# Patient Record
Sex: FEMALE | Race: WHITE | NOT HISPANIC OR LATINO | Employment: OTHER | URBAN - NONMETROPOLITAN AREA
[De-identification: names, ages, dates, MRNs, and addresses within clinical notes are randomized per-mention and may not be internally consistent; named-entity substitution may affect disease eponyms.]

---

## 2017-03-03 ENCOUNTER — NON-PROVIDER VISIT (OUTPATIENT)
Dept: URGENT CARE | Facility: PHYSICIAN GROUP | Age: 22
End: 2017-03-03

## 2017-03-03 DIAGNOSIS — Z11.1 ENCOUNTER FOR PPD TEST: ICD-10-CM

## 2017-03-03 PROCEDURE — 86580 TB INTRADERMAL TEST: CPT | Performed by: NURSE PRACTITIONER

## 2017-03-06 ENCOUNTER — NON-PROVIDER VISIT (OUTPATIENT)
Dept: URGENT CARE | Facility: PHYSICIAN GROUP | Age: 22
End: 2017-03-06

## 2017-03-06 DIAGNOSIS — Z11.1 PPD SCREENING TEST: ICD-10-CM

## 2017-03-06 LAB — TB WHEAL 3D P 5 TU DIAM: NEGATIVE MM

## 2017-12-07 ENCOUNTER — NON-PROVIDER VISIT (OUTPATIENT)
Dept: URGENT CARE | Facility: PHYSICIAN GROUP | Age: 22
End: 2017-12-07

## 2017-12-07 DIAGNOSIS — Z02.1 PRE-EMPLOYMENT DRUG SCREENING: ICD-10-CM

## 2017-12-07 LAB
AMP AMPHETAMINE: NORMAL
COC COCAINE: NORMAL
INT CON NEG: NORMAL
INT CON POS: NORMAL
MET METHAMPHETAMINES: NORMAL
OPI OPIATES: NORMAL
PCP PHENCYCLIDINE: NORMAL
POC DRUG COMMENT 753798-OCCUPATIONAL HEALTH: NORMAL
THC: NORMAL

## 2017-12-07 PROCEDURE — 80305 DRUG TEST PRSMV DIR OPT OBS: CPT | Performed by: PHYSICIAN ASSISTANT

## 2018-05-09 ENCOUNTER — NON-PROVIDER VISIT (OUTPATIENT)
Dept: URGENT CARE | Facility: PHYSICIAN GROUP | Age: 23
End: 2018-05-09

## 2018-05-09 DIAGNOSIS — Z11.1 SPECIAL SCREENING EXAMINATION FOR RESPIRATORY TUBERCULOSIS: ICD-10-CM

## 2018-05-09 PROCEDURE — 86580 TB INTRADERMAL TEST: CPT | Performed by: NURSE PRACTITIONER

## 2018-05-11 ENCOUNTER — NON-PROVIDER VISIT (OUTPATIENT)
Dept: URGENT CARE | Facility: PHYSICIAN GROUP | Age: 23
End: 2018-05-11

## 2018-05-11 DIAGNOSIS — Z11.1 SCREENING EXAMINATION FOR PULMONARY TUBERCULOSIS: ICD-10-CM

## 2018-05-11 LAB — TB WHEAL 3D P 5 TU DIAM: NORMAL MM

## 2018-05-16 ENCOUNTER — NON-PROVIDER VISIT (OUTPATIENT)
Dept: URGENT CARE | Facility: PHYSICIAN GROUP | Age: 23
End: 2018-05-16

## 2018-05-16 DIAGNOSIS — Z11.1 SPECIAL SCREENING EXAMINATION FOR RESPIRATORY TUBERCULOSIS: ICD-10-CM

## 2018-05-16 PROCEDURE — 86580 TB INTRADERMAL TEST: CPT | Performed by: NURSE PRACTITIONER

## 2018-05-19 ENCOUNTER — NON-PROVIDER VISIT (OUTPATIENT)
Dept: URGENT CARE | Facility: PHYSICIAN GROUP | Age: 23
End: 2018-05-19

## 2018-05-19 LAB — TB WHEAL 3D P 5 TU DIAM: NORMAL MM

## 2018-09-17 ENCOUNTER — NON-PROVIDER VISIT (OUTPATIENT)
Dept: URGENT CARE | Facility: PHYSICIAN GROUP | Age: 23
End: 2018-09-17

## 2018-09-17 DIAGNOSIS — Z02.1 PRE-EMPLOYMENT DRUG SCREENING: ICD-10-CM

## 2018-09-17 LAB
AMP AMPHETAMINE: NORMAL
COC COCAINE: NORMAL
INT CON NEG: NORMAL
INT CON POS: NORMAL
MET METHAMPHETAMINES: NORMAL
OPI OPIATES: NORMAL
PCP PHENCYCLIDINE: NORMAL
POC DRUG COMMENT 753798-OCCUPATIONAL HEALTH: NEGATIVE
THC: NORMAL

## 2018-09-17 PROCEDURE — 80305 DRUG TEST PRSMV DIR OPT OBS: CPT | Performed by: PHYSICIAN ASSISTANT

## 2023-03-16 ENCOUNTER — OFFICE VISIT (OUTPATIENT)
Dept: URGENT CARE | Facility: PHYSICIAN GROUP | Age: 28
End: 2023-03-16
Payer: COMMERCIAL

## 2023-03-16 VITALS
SYSTOLIC BLOOD PRESSURE: 96 MMHG | RESPIRATION RATE: 14 BRPM | BODY MASS INDEX: 20.4 KG/M2 | HEIGHT: 67 IN | HEART RATE: 88 BPM | WEIGHT: 130 LBS | OXYGEN SATURATION: 97 % | TEMPERATURE: 98 F | DIASTOLIC BLOOD PRESSURE: 58 MMHG

## 2023-03-16 DIAGNOSIS — R52 BODY ACHES: ICD-10-CM

## 2023-03-16 LAB
FLUAV RNA SPEC QL NAA+PROBE: NEGATIVE
FLUBV RNA SPEC QL NAA+PROBE: NEGATIVE
RSV RNA SPEC QL NAA+PROBE: NEGATIVE
S PYO DNA SPEC NAA+PROBE: NOT DETECTED
SARS-COV-2 RNA RESP QL NAA+PROBE: POSITIVE

## 2023-03-16 PROCEDURE — 87651 STREP A DNA AMP PROBE: CPT | Performed by: NURSE PRACTITIONER

## 2023-03-16 PROCEDURE — 99214 OFFICE O/P EST MOD 30 MIN: CPT | Performed by: NURSE PRACTITIONER

## 2023-03-16 PROCEDURE — 0241U POCT CEPHEID COV-2, FLU A/B, RSV - PCR: CPT | Performed by: NURSE PRACTITIONER

## 2023-03-16 ASSESSMENT — ENCOUNTER SYMPTOMS
NAUSEA: 0
SORE THROAT: 0
COUGH: 1
EYE DISCHARGE: 0
CHILLS: 1
ABDOMINAL PAIN: 0
VOMITING: 0
SHORTNESS OF BREATH: 0
DIZZINESS: 1
SPUTUM PRODUCTION: 1
DIARRHEA: 0
EYE PAIN: 0
HEADACHES: 1
WHEEZING: 0
SINUS PAIN: 1
EYE REDNESS: 0

## 2023-03-16 NOTE — PROGRESS NOTES
Patient has consented to treatment and for use of patient information for treatment and billing purposes.    Chief Complaint:    Chief Complaint   Patient presents with    Body Aches     X2 days body aches, mostly on shoulders and neck, pressure in B ears, dizzy congested, slight cough         History of Present Illness: 27 y.o. female  presents 2-day history of body aches in her upper back and neck, bilateral ear pressure, cough with green and clear phlegm, sinus pressure, nasal congestion with green and clear mucus, chills, wheezing, and fatigue.  She is unsure if she has had any fevers    Take over-the-counter Tylenol which helped minimally with her symptoms mostly with body aches  Home COVID test x 3 were all negative at home.   Denies any known exposure to COVID, RSV, influenza, or strep.    She did recently start going back to the gym.    She does state that she is currently 10 weeks pregnant.  She does follow with her OB/GYN appointment is scheduled for .  She denies any abnormal cramping, she denies any vaginal discharge or bleeding.        Review of Systems   Constitutional:  Positive for chills. Negative for malaise/fatigue.   HENT:  Positive for congestion, ear pain and sinus pain. Negative for ear discharge and sore throat.    Eyes:  Negative for pain, discharge and redness.   Respiratory:  Positive for cough and sputum production. Negative for shortness of breath and wheezing.    Gastrointestinal:  Negative for abdominal pain, diarrhea, nausea and vomiting.   Skin:  Negative for rash.   Neurological:  Positive for dizziness and headaches.       Medications, Allergies, and current problem list reviewed today in Epic.    Physical Exam:  Vitals:    23 1129   BP: 96/58   Pulse: 88   Resp: 14   Temp: 36.7 °C (98 °F)   SpO2: 97%        Physical Exam  Vitals reviewed.   Constitutional:       General: She is not in acute distress.     Appearance: Normal appearance. She is ill-appearing. She is  not toxic-appearing.   HENT:      Right Ear: Tympanic membrane, ear canal and external ear normal.      Left Ear: Tympanic membrane, ear canal and external ear normal.      Nose: Mucosal edema and rhinorrhea present. Rhinorrhea is clear.      Right Sinus: No maxillary sinus tenderness or frontal sinus tenderness.      Left Sinus: No maxillary sinus tenderness or frontal sinus tenderness.      Mouth/Throat:      Lips: Pink.      Mouth: Mucous membranes are moist.      Pharynx: Posterior oropharyngeal erythema present. No oropharyngeal exudate or uvula swelling.      Tonsils: No tonsillar exudate.   Cardiovascular:      Rate and Rhythm: Normal rate and regular rhythm.      Heart sounds: No murmur heard.  Pulmonary:      Effort: Pulmonary effort is normal. No respiratory distress.      Breath sounds: Normal breath sounds. No wheezing, rhonchi or rales.   Musculoskeletal:      Cervical back: Normal range of motion.   Lymphadenopathy:      Cervical: No cervical adenopathy.   Skin:     General: Skin is warm and dry.   Neurological:      Mental Status: She is alert.        Diagnostics:  Results for orders placed or performed in visit on 03/16/23   POCT GROUP A STREP, PCR   Result Value Ref Range    POC Group A Strep, PCR Not Detected Not Detected, Invalid   POCT CoV-2, Flu A/B, RSV by PCR   Result Value Ref Range    SARS-CoV-2 by PCR Positive (A) Negative, Invalid    Influenza virus A RNA Negative Negative, Invalid    Influenza virus B, PCR Negative Negative, Invalid    RSV, PCR Negative Negative, Invalid       Diagnostics interpreted by myself.    Medical Decision Making:  I personally reviewed prior external notes and test results pertinent to today's visit.   Shared decision-making was utilized with patient did develop treatment plan and clinic course. Pleasant, 27 y.o. female  presents 2-day history of body aches in her upper back and neck, bilateral ear pressure, cough with green and clear phlegm, sinus pressure,  nasal congestion with green and clear mucus, chills, wheezing, and fatigue.  She is unsure if she has had any fevers    Take over-the-counter Tylenol which helped minimally with her symptoms mostly with body aches  Home COVID test x 3 were all negative at home.   Denies any known exposure to COVID, RSV, influenza, or strep.    She did recently start going back to the gym.    She does state that she is currently 10 weeks pregnant.  She does follow with her OB/GYN appointment is scheduled for .  She denies any abnormal cramping, she denies any vaginal discharge or bleeding.          1300-attempted to call patient to discuss positive COVID test results.  Left voicemail for her to call back.    1345-called patient and discussed positive COVID test.  Discussed prescribing Paxlovid versus nontreatment options.  Patient does want to discuss options with her  and possibly OB/GYN.  Did discuss data available on up-to-date as listed below.  At this time she has declined Paxlovid.  She reports that she did have COVID during her last pregnancy at 7 months and did well.    Up-to-date  Pregnancy Considerations   Adverse events were observed following exposure to nirmatrelvir in some embryo-fetal developmental toxicity studies (FDA ).  Nirmatrelvir and ritonavir are currently available under FDA emergency use authorization (EUA) for the treatment of COVID-19. Pregnant patients were not eligible for inclusion in an initial phase 2/3 study (Mikel ).  Refer to the Ritonavir monograph for information related to ritonavir and pregnancy.  The risk of severe illness from COVID-19 infection is increased in symptomatic pregnant patients compared to nonpregnant patients. Pregnant and recently pregnant patients with moderate or severe infection are at increased risk of complications such as hypertensive disorders of pregnancy, postpartum hemorrhage, or other infections compared to pregnant patients without  COVID-19. Symptomatic pregnant patients may require ICU admission, mechanical ventilation, or ventilatory support (ECMO) compared to symptomatic nonpregnant patients. Other adverse pregnancy outcomes include  birth and stillbirth. The risk of coagulopathy,  delivery, and maternal death may be increased; neonates have an increased risk for NICU admission. Maternal age and comorbidities such as diabetes, hypertension, lung disease, and obesity may also increase the risk of severe illness in pregnant and recently pregnant patients (ACOG 2022; NIH 2022).  Outcome data following maternal use of nirmatrelvir and ritonavir during pregnancy are limited (Muñoz 2022). In general, the treatment of COVID-19 infection during pregnancy is the same as in nonpregnant patients. However, because data for most therapeutic agents in pregnant patients are limited, treatment options should be evaluated as part of a shared decision-making process (NIH 2022). Pregnancy is a risk factor for severe COVID-19; the use of nirmatrelvir and ritonavir may be offered to pregnant and recently pregnant patients (NIH 2022). Use may be considered in nonhospitalized, COVID-19-positive, pregnant patients who have mild to moderate symptoms, especially patients with one or more additional risk factors (eg, BMI >25, cardiovascular disease, chronic kidney disease, diabetes mellitus) (ACOG 2022). Evaluate potential drug interactions prior to prescribing (ACOG 2022; NIH 2022). Information related to the treatment of COVID-19 during pregnancy continues to emerge; refer to current guidelines for the treatment of pregnant patients.  Data collection to monitor maternal and infant outcomes following exposure to COVID-19 during pregnancy is ongoing. Health care providers are encouraged to enroll patients exposed to COVID-19 during pregnancy in the [employer] pregnancy registry (1-367.158.4014;  https://[facility].org/join-study/).      Did advise patient on conservative measures for management of symptoms.  Patient is agreeable to pursue adequate rest, adequate hydration, saltwater gargle and Neti pot for any symptoms of upper respiratory congestion.  Over-the-counter analgesia and antipyretics on a p.r.n. basis as needed for pain and fever.  Did discuss over-the-counter cough medications.      Patient will monitor symptoms closely for worsening and is advised to seek further evaluation the emergency room if alarm signs or symptoms arise.  Patient states understanding and verbalizes agreement with this plan of care.    Plan:  Verbal and/or printed education was provided regarding the assessment and diagnosis.  All of the patient's questions were answered to their satisfaction at the time of discharge.    1. Body aches  - POCT GROUP A STREP, PCR  - POCT CoV-2, Flu A/B, RSV by PCR      My total time spent caring for the patient on the day of the encounter was 33 minutes.   This does not include time spent on separately billable procedures/tests.    Disposition:  Patient was discharged in stable condition.    Voice Recognition Disclaimer: Portions of this document were created using voice recognition software. The software does have a chance of producing errors of grammar and possibly content. I have made every reasonable attempt to correct obvious errors, but there may be errors of grammar and possibly content that I did not discover before finalizing the documentation.

## 2023-05-03 LAB
HBV SURFACE AG SERPL QL IA: NEGATIVE
HIV 1+2 AB+HIV1 P24 AG SERPL QL IA: NON REACTIVE
RUBV IGG SERPL IA-ACNC: NORMAL

## 2023-06-20 ENCOUNTER — HOSPITAL ENCOUNTER (INPATIENT)
Facility: MEDICAL CENTER | Age: 28
LOS: 7 days | DRG: 833 | End: 2023-06-29
Attending: OBSTETRICS & GYNECOLOGY | Admitting: OBSTETRICS & GYNECOLOGY
Payer: COMMERCIAL

## 2023-06-20 LAB
ABO + RH BLD: NORMAL
ABO GROUP BLD: NORMAL
BLD GP AB SCN SERPL QL: NORMAL
RH BLD: NORMAL

## 2023-06-20 PROCEDURE — 86901 BLOOD TYPING SEROLOGIC RH(D): CPT

## 2023-06-20 PROCEDURE — 36415 COLL VENOUS BLD VENIPUNCTURE: CPT

## 2023-06-20 PROCEDURE — 86900 BLOOD TYPING SEROLOGIC ABO: CPT

## 2023-06-20 PROCEDURE — G0378 HOSPITAL OBSERVATION PER HR: HCPCS

## 2023-06-20 PROCEDURE — 302449 STATCHG TRIAGE ONLY (STATISTIC)

## 2023-06-20 PROCEDURE — 96372 THER/PROPH/DIAG INJ SC/IM: CPT

## 2023-06-20 PROCEDURE — 700111 HCHG RX REV CODE 636 W/ 250 OVERRIDE (IP): Performed by: OBSTETRICS & GYNECOLOGY

## 2023-06-20 PROCEDURE — 86850 RBC ANTIBODY SCREEN: CPT

## 2023-06-20 RX ORDER — BETAMETHASONE SODIUM PHOSPHATE AND BETAMETHASONE ACETATE 3; 3 MG/ML; MG/ML
12 INJECTION, SUSPENSION INTRA-ARTICULAR; INTRALESIONAL; INTRAMUSCULAR; SOFT TISSUE EVERY 24 HOURS
Status: COMPLETED | OUTPATIENT
Start: 2023-06-20 | End: 2023-06-21

## 2023-06-20 RX ADMIN — BETAMETHASONE SODIUM PHOSPHATE AND BETAMETHASONE ACETATE 12 MG: 3; 3 INJECTION, SUSPENSION INTRA-ARTICULAR; INTRALESIONAL; INTRAMUSCULAR at 17:44

## 2023-06-20 ASSESSMENT — LIFESTYLE VARIABLES
ALCOHOL_USE: NO
EVER_SMOKED: NEVER

## 2023-06-20 ASSESSMENT — PAIN SCALES - GENERAL: PAINLEVEL: 0 - NO PAIN

## 2023-06-20 ASSESSMENT — PATIENT HEALTH QUESTIONNAIRE - PHQ9
SUM OF ALL RESPONSES TO PHQ9 QUESTIONS 1 AND 2: 0
1. LITTLE INTEREST OR PLEASURE IN DOING THINGS: NOT AT ALL
2. FEELING DOWN, DEPRESSED, IRRITABLE, OR HOPELESS: NOT AT ALL

## 2023-06-21 PROCEDURE — A9270 NON-COVERED ITEM OR SERVICE: HCPCS | Performed by: OBSTETRICS & GYNECOLOGY

## 2023-06-21 PROCEDURE — 96372 THER/PROPH/DIAG INJ SC/IM: CPT

## 2023-06-21 PROCEDURE — 302790 HCHG STAT ANTEPARTUM CARE, DAILY

## 2023-06-21 PROCEDURE — 700102 HCHG RX REV CODE 250 W/ 637 OVERRIDE(OP): Performed by: OBSTETRICS & GYNECOLOGY

## 2023-06-21 PROCEDURE — 700111 HCHG RX REV CODE 636 W/ 250 OVERRIDE (IP): Performed by: OBSTETRICS & GYNECOLOGY

## 2023-06-21 PROCEDURE — G0378 HOSPITAL OBSERVATION PER HR: HCPCS

## 2023-06-21 RX ORDER — VITAMIN A ACETATE, BETA CAROTENE, ASCORBIC ACID, CHOLECALCIFEROL, .ALPHA.-TOCOPHEROL ACETATE, DL-, THIAMINE MONONITRATE, RIBOFLAVIN, NIACINAMIDE, PYRIDOXINE HYDROCHLORIDE, FOLIC ACID, CYANOCOBALAMIN, CALCIUM CARBONATE, FERROUS FUMARATE, ZINC OXIDE, CUPRIC OXIDE 3080; 12; 120; 400; 1; 1.84; 3; 20; 22; 920; 25; 200; 27; 10; 2 [IU]/1; UG/1; MG/1; [IU]/1; MG/1; MG/1; MG/1; MG/1; MG/1; [IU]/1; MG/1; MG/1; MG/1; MG/1; MG/1
1 TABLET, FILM COATED ORAL
Status: DISCONTINUED | OUTPATIENT
Start: 2023-06-21 | End: 2023-06-29 | Stop reason: HOSPADM

## 2023-06-21 RX ADMIN — BETAMETHASONE SODIUM PHOSPHATE AND BETAMETHASONE ACETATE 12 MG: 3; 3 INJECTION, SUSPENSION INTRA-ARTICULAR; INTRALESIONAL; INTRAMUSCULAR at 17:30

## 2023-06-21 RX ADMIN — PRENATAL WITH FERROUS FUM AND FOLIC ACID 1 TABLET: 3080; 920; 120; 400; 22; 1.84; 3; 20; 10; 1; 12; 200; 27; 25; 2 TABLET ORAL at 10:03

## 2023-06-21 NOTE — PROGRESS NOTES
OB Progress Note  23w5d  Date of Admission: 2023      Subjective:   Pt reports feeling well, +FM x2, denies LOF/VB/ctx.      Objective:   24hr VS:  Temp:  [9 °C (48.2 °F)-36.4 °C (97.6 °F)] 36.4 °C (97.6 °F)  Pulse:  [] 83  Resp:  [16-18] 17  BP: (102-110)/(57-58) 102/57  SpO2:  [95 %] 95 %    Gen: AAO  Abdomen: gravid, soft, NT  Ext: NT, no edema    NST:  Indication: fetal growth restriction abnormal, UA dopplers  A: 140/moderate variability/+ accelerations/no decelerations  B: 150/moderate variability/+ accelerations/no decelerations  [place]: no ctx      Meds:     Current Facility-Administered Medications:     prenatal plus vitamin (STUARTNATAL 1+1) 27-1 MG tablet 1 Tablet, 1 Tablet, Oral, Daily-0800, Gena Staley M.D.    betamethasone acetate-betamethasone sodium phosphate (CELESTONE) injection 12 mg, 12 mg, Intramuscular, Q24HR, Becca Reyes M.D., 12 mg at 23 1744    Labs:    Lab:   Recent Results (from the past 72 hour(s))   COD - Adult (Type and Screen)    Collection Time: 23  6:50 PM   Result Value Ref Range    ABO Grouping Only A     Rh Grouping Only POS     Antibody Screen-Cod NEG    ABO Rh Confirm    Collection Time: 23  7:39 PM   Result Value Ref Range    ABO Rh Confirm A POS        A/P:  27 y.o.  @ 23w5d w/ mo-di TIUP, severe growth restriction of twin B with absent end diastolic flow    - Growth US : A 603g (38%)/B 474g (3%) suspected unequal placental sharing  over TTTS, appreciate MFM involvement and guidance regarding monitoring/management.  - continuous  monitoring overnight reassuring, now decreased to 1hr per shift, discussed with Dr. Howard.   - BMZ #1 given last night, #2 to be given this evening.  - if signs of needing to consider delivery would start mag sulfate for fetal neuroprotection.    - pt seen with Dr. Dumont from neonatology.  Pt confirms she would want emergency c/s for fetal distress at this time and would want full resuscitation  for twins, see henrique documentation for full details of discussion regarding  outcomes.  Discussed w/ pt classical c/s would be needed if delivery in the near future with vertical uterine incision, increased risk of blood loss, and impact on future pregnancies/deliveries      - VTE ppx: SCDs when in bed    dispo: cont inpatient monitoring    Gena Staley MD, FACOG  [facility]

## 2023-06-21 NOTE — PROGRESS NOTES
0700: Report received from Sandy OLIVER RN. POC discussed.     0720: Assessment done. Pt denies LOF, VB, or UCs/cramping. Reports + FM. Discussed POC with pt. Encouraged pt to call with needs.     0830: Dr. Howard at bedside. POC discussed. MD reviewed FHT tracing. MD ordering to monitor pt 1 hour q shift.     0835: External monitors reviewed.     1315: Dr. Dumont (Neonatologist) and Dr. Medley at bedside to discussed POC.     1900: Report given to Sandy OLIVER RN. POC discussed.

## 2023-06-21 NOTE — CONSULTS
consultation    Indication Twin Mo/Di with discordant growth with Twin B with absent end diastolic flow at 23-5/7 weeks    Requested by Dr. Medley    I met with Ms. Garza, Ms Garza's father, and Dr. Medley.     Ms Garza is a 26 yo  who presents at 23-5/7 wks EGA mo/di twin gestation with IUGR and AEDF in twin B for monitoring and betamethasone injections.      serologies: A positivie/RI/HIV negative/Hep B negative/RPR negative/GBS unknown.     This is a spontaneous conception.     This pregnancy was complicated by:  1) Twin Gestation Twin A 603 g Twin B 474g   2) AEDF in Twin B    She is expecting twin boys.   FHT are reassuring at this time.     Mom is seen by MFM Dr. Rocha.     Medications  1) Prenatal vitamins  2) Betamethasone first dose on  at 17:30.     We discussed the followin) Maternal history and medications reviewed.   2) Complications with mono/di twins  3) Complications with IUGR and SGA  4) NICU environment  5) Delivery room resuscitation: Discussed infants may be too small to intubate, and if this is the case, we will plan to provide comfort care. Mom is aware of increase mortality associated with 23-24 wks EGA twin. Discussed possible need for intubation, surfactant, chest compressions, cardiac medications, emergent umbilical lines and ventilation in the delivery room. If mom and babies are stable, plan for delay cord clamping, plastic wrap and use of a chemical mattress. Plan to allow mom to see infants briefly with goal of transporting to NICU as soon as infants are stable.   6) Discussed increased mortality of this EGA  7) Complications with prematurity:  Neuro: IVH, Apnea, Temp instability, developmental delays, hearing and vision loss. ROP and eye screening. HUS.   Resp: RDS, intubation, surfactant, ventilation, CLD and prolong respiratory support and home oxygen.   CV: Hypotension and PDA  GI/FEN: Mom plans to breast feed, discussed pumping and lactation.  "Gavage feeds, trophic feeds, NEC, TPN and line access  ID: Septic screens and antibiotic stewardship.   Heme: Blood products and jaundice.     Mom expressed she would \"like everything to be done for the boys.\"     She would like for OB to proceed to emergent c/s for fetal distress.   She would like full resuscitation of the infants, including chest compressions.     Family expressed understanding and have no additional questions at this time.       Thank you for this consultation. Please contact NICU for additional questions or concerns.     Total time spent in consultation 60 minutes with 32 minutes face to face time.         "

## 2023-06-21 NOTE — PROGRESS NOTES
1655: patient arrived to L&D Ante[facility] after being sent over from [facility] for 48 hour continuous monitoring and to receive betamethasone injections. EFM & TOCO applied. Patient reports +FM, denies LOF, denies VB, denies Uc's. VSS. Patient A&O and in stable condition. Denies pain upon arrival. Denies needs at this time. Call light and belongings in reach.   1706: Dr. Reyes at bedside to evaluate patient. Orders received.  1900: Report to Sandy OLIVER RN. Care relinquished.

## 2023-06-21 NOTE — H&P
"Labor and Delivery History and Physical    CC: inappropriate placental sharing, growth restriction and absent end diastolic flow twin B    HPI:Vicky Garza is a 26 yo  with monochorionic diamniotic twins at 23w4d. She is receiving her prenatal care with Dr Michael this pregnancy. She was referred to [facility] for TTTS monitoring.  Initial scans at 16 weeks were concordant in growth. She had a normal anatomic scan completed at 20w5d. At that time, biometry demonstrated selective fetal growth restriction of twin B with 31% discordance. At her follow up biometry today twin A had EFW in the 387% and a normal fetal echo. DVP was 3.2cm. Twin A was vertex. Twin B had an EFW in the 3rd percentile with AC in the 6th percentil. Absent end diastolic flow as noted on UA doppler. Fetal echo was normal with DVP of 2.9cm. Twin B was breech. A plan was generated for admission for monitoring and betamethasone administration.     All other systems were reviewed and were negative.    PMH: Asthma  PSH:None  OB HX:   2019: Term , male  : Term , female  Gyn Hx: Denies STI including HSV for herself or her spouse. Denies abnormal pap smears. Last pap was NILM in .   SH:Denies T/E/D  Meds:PNV  Allergies: NKDA    /58   Pulse (!) 133   Temp (!) 9 °C (48.2 °F) (Temporal)   Resp 18   Ht 1.702 m (5' 7\")   Wt 71.7 kg (158 lb)   SpO2 95%   Breastfeeding Yes Comment: plans to breastfeed  BMI 24.75 kg/m²     Physical Exam  Gen: NAD  Abd: gravid, non tender  Ext: no edema    FHT:  Twin A baseline of 150s, moderate variability present, accelerations present, decelerations absent  Twin B baseline of 160s, moderate variability present, a decelerations present, tracing is intermittently discontinuous so difficult to comment on decelerations  [place]: Quiescent    Laboratory Evaluation  ABO:A pos  GBS:unknown  GTT:unknown  Rubella: Immune  HIV: Neg  RPR: NR  Hep B: neg  Hep C: neg    Assessment:   Monochorionic diamniotic twin " pregnancy at 23w4d  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  Discussed with patient that corticosteroids are given 2 days in advance of viability so that the steroid benefit is realized at viability exactly.  Prior to that, heroic measures taken for fetal distress are unlikely to have a good outcome.   HR PC to consult in the a.m.      Becca Reyes M.D.

## 2023-06-21 NOTE — PROGRESS NOTES
1900: Received report from Nikki DALLAS plan of care discussed. Call light in reach, pt encouraged to use when in need of assistance.

## 2023-06-22 PROCEDURE — A9270 NON-COVERED ITEM OR SERVICE: HCPCS | Performed by: OBSTETRICS & GYNECOLOGY

## 2023-06-22 PROCEDURE — 770002 HCHG ROOM/CARE - OB PRIVATE (112)

## 2023-06-22 PROCEDURE — 700102 HCHG RX REV CODE 250 W/ 637 OVERRIDE(OP): Performed by: OBSTETRICS & GYNECOLOGY

## 2023-06-22 PROCEDURE — 302790 HCHG STAT ANTEPARTUM CARE, DAILY

## 2023-06-22 RX ORDER — ASPIRIN 81 MG/1
81 TABLET ORAL DAILY
Status: ON HOLD | COMMUNITY
End: 2023-07-28

## 2023-06-22 RX ADMIN — PRENATAL WITH FERROUS FUM AND FOLIC ACID 1 TABLET: 3080; 920; 120; 400; 22; 1.84; 3; 20; 10; 1; 12; 200; 27; 25; 2 TABLET ORAL at 08:11

## 2023-06-22 ASSESSMENT — PATIENT HEALTH QUESTIONNAIRE - PHQ9
2. FEELING DOWN, DEPRESSED, IRRITABLE, OR HOPELESS: NOT AT ALL
1. LITTLE INTEREST OR PLEASURE IN DOING THINGS: NOT AT ALL
SUM OF ALL RESPONSES TO PHQ9 QUESTIONS 1 AND 2: 0

## 2023-06-22 NOTE — PROGRESS NOTES
" PROGRESS NOTE    DATE: 23  Hospital day: 2  Gestational Age: 23w6d, Estimated Date of Delivery: 10/13/23  Primary OB/GYN: Daysi Ríos MD   [facility] Consultant: [facility]    SUBJECTIVE  Doing ok. But tearful.  at bedside. Good FM.     OBJECTIVE:  /63   Pulse 85   Temp 36.7 °C (98 °F) (Temporal)   Resp 16   Ht 1.702 m (5' 7\")   Wt 71.7 kg (158 lb)   SpO2 98%     Review of systems:     PHYSICAL EXAM:  General:   Alert, conversational, pleasant, no acute distress  Abdomen:  Soft, gravid, non-tender, non-distended     FHT A: 150s with moderate LTV. No decels  FHT B: 150s with moderate LTV. No decels  [place]: no CTX     Medications:   Current Facility-Administered Medications   Medication Dose Route Frequency Provider Last Rate Last Admin    prenatal plus vitamin (STUARTNATAL 1+1) 27-1 MG tablet 1 Tablet  1 Tablet Oral Daily-0800 Gena Staley M.D.   1 Tablet at 23 0811        Labs:   COD completed     Imaging:   US (completed OP at [facility] 2023)   A  603g, 38%, AC 39%, normal UA doppler, SDP 3.2cm,  Vertex  B 474g, 3%, AC 6%, AEDF, SDP 2.9cm,  Breech     TREATMENT & PROPHYLAXIS  Steroids: Given  and   Magnesium: none currently  Antibiotics: none currently   VTE Prophylaxis : SCDs  NICU Consult: completed   GTT: not yet completed       ASSESSMENT  - 28yo  @ 23w6d  - Mo/DI TIUP  - Inappropriate placental sharing with selective growth restriction of fetus B  - IUGR B with AEDF  - Vertex/Breech presentation    PLAN:  Mo/Di TIUP: Currently suspected to be inappropriate placental sharing rather than TTTS given lack of CHRISTI changes. Repeat dopplers planned 1 week  2.   FWB  -s/p ANCS and NICU consult   -Fetal surveillance reassuring for GA  3.   Delivery mode  -Likely LTCS. Aware of potential risk of classical csxn  4. Routine care:   -GTT will need to be completed in a week  -Has not had admission labs  -No IV access per [facility]  5. Dispo: Continue inpatient " management    Bailey Ortiz MD
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
## 2023-06-22 NOTE — CONSULTS
Seen on rounds. She was admitted from [facility] office for BMZ and observation given known FGR now with new onset of absent EDF in known M/D twin pregnancy. Our scan shows no e/o TTTS, but her constellation of signs are consistent with unequal placental sharing of the common placenta.   She confirms FM, no e/o progressive ctx, LOF or VB    AFVSS  Abd; soft, NTTP  Ext: NT, no cord or e/o DVT  [place]: no reg ctx, I was unable to access or see FHT.      A/P:  M/D twins at 23 4/7wk. With FGR of twin B and noted occasional absent end diastolic flow.  Given she is at limits of viability for fetuses, she confirms she desires full intervention  -con IP observation at least through steroid window  -watch for s/sx of fetal hypoxia, especially for twin B (the smaller twin)  -we will plan repeat UA Doppler after steroid window if she becomes a candidate for potential OP mgmt if there are no signs for progressive fetal hypoxia/acidosis  -may continue FHM each shift at minimum but will increase to q 8hr given risk for underlying placental insufficiency.     Total time spent on floor:35min

## 2023-06-22 NOTE — PROGRESS NOTES
0700. Report from Sandy OLIVER RN. POC discussed and resumed.    0811. At the bedside. Pt denies uc's, leaking or bleeding and notes +FM. Pt tearful about having to be admitted. Discussed with the pt her worries and encouraged the pt to voice her concerns. She has no questions or concerns at this time.    1900. Report to Mary DALLAS. POC discussed.

## 2023-06-22 NOTE — PROGRESS NOTES
1900: Received report from Coby DALLAS, plan of care discussed. Call light in reach, pt encouraged to use when in need of assistance.    0110: Dr. Medley reviewed FM strip. Ok to take monitors off.    0700: Repot given to Aldair DALLAS, plan of care discussed. Plan for Dr. Howard to come to bedside for US and to discussed further plan of care with the pt.

## 2023-06-22 NOTE — CONSULTS
Seen on round and d/w RN staff. Fetuses are active which makes monitoring very difficult. She feels well, no ctx/LOF/VB    O: AFVSS  Abd: soft, NTTP  Ext: NT, no edema  Bedside US by me shows normal UA Doppler flow for twin A, but twin B continues with absent EDF that persists.     FHT: 140s A, 150s B without decels and occasional accels  [place]: no ctx   A/P: Mm/D twins a t23 6/7wk  Abnormal (absent ) EDF of UA Doppler in twin B in setting of FGR-this is c/w unequal placental sharing. S/p BMZ, full intervention   -Will con with IP observation-change to full admit  -recheck UA Dopplers in one week and if still abnormal, will ask for MCA Doppler check to ensure no e/o TAPS.     Total time on floor 35min

## 2023-06-22 NOTE — CARE PLAN
The patient is Stable - Low risk of patient condition declining or worsening    Shift Goals  Clinical Goals: Healthy mom, healthy babies  Patient Goals: stay pregnant    Progress made toward(s) clinical / shift goals:  rest    Patient is not progressing towards the following goals:

## 2023-06-23 PROCEDURE — 770002 HCHG ROOM/CARE - OB PRIVATE (112)

## 2023-06-23 PROCEDURE — 302790 HCHG STAT ANTEPARTUM CARE, DAILY

## 2023-06-23 RX ORDER — HYDROXYZINE HYDROCHLORIDE 25 MG/1
25 TABLET, FILM COATED ORAL 3 TIMES DAILY PRN
Status: DISCONTINUED | OUTPATIENT
Start: 2023-06-23 | End: 2023-06-23

## 2023-06-23 RX ORDER — HYDROXYZINE 50 MG/1
50 TABLET, FILM COATED ORAL EVERY 6 HOURS PRN
Status: DISCONTINUED | OUTPATIENT
Start: 2023-06-23 | End: 2023-06-23

## 2023-06-23 RX ORDER — ASPIRIN 81 MG/1
81 TABLET, CHEWABLE ORAL DAILY
Status: DISCONTINUED | OUTPATIENT
Start: 2023-06-23 | End: 2023-06-29 | Stop reason: HOSPADM

## 2023-06-23 RX ORDER — HYDROXYZINE 50 MG/1
50 TABLET, FILM COATED ORAL EVERY 6 HOURS PRN
Status: DISCONTINUED | OUTPATIENT
Start: 2023-06-23 | End: 2023-06-29 | Stop reason: HOSPADM

## 2023-06-23 RX ORDER — FOLIC ACID 1 MG/1
1 TABLET ORAL DAILY
Status: DISCONTINUED | OUTPATIENT
Start: 2023-06-23 | End: 2023-06-29 | Stop reason: HOSPADM

## 2023-06-23 ASSESSMENT — PATIENT HEALTH QUESTIONNAIRE - PHQ9
2. FEELING DOWN, DEPRESSED, IRRITABLE, OR HOPELESS: NOT AT ALL
SUM OF ALL RESPONSES TO PHQ9 QUESTIONS 1 AND 2: 0
1. LITTLE INTEREST OR PLEASURE IN DOING THINGS: NOT AT ALL

## 2023-06-23 ASSESSMENT — LIFESTYLE VARIABLES: EVER_SMOKED: NEVER

## 2023-06-23 NOTE — CARE PLAN
The patient is Watcher - Medium risk of patient condition declining or worsening    Shift Goals  Clinical Goals: healthy mom and babies  Patient Goals: remain pregnant  Family Goals: Support Vicky Duncan

## 2023-06-23 NOTE — PROGRESS NOTES
1900: Report received from Aldair DALLAS, POC discussed.     1945: Initial assessment completed. Pt is visiting with family at this time and requested to complete NST when they leave. Pt reports +FM, denies ctx, LOF or vaginal bleeding. POC for the night discussed and pt is agreeable. All questions and needs met at this time.     2207: MD Working reviewed FHT tracing and gave order to discontinue at this time.     0500: Report given to Sana DALLAS, POC discussed.

## 2023-06-23 NOTE — CARE PLAN
The patient is Stable - Low risk of patient condition declining or worsening    Shift Goals  Clinical Goals: healthy mom and babies  Patient Goals: remain pregnant  Family Goals: Support Vicky    Progress made toward(s) clinical / shift goals:  rest, stay pregnant    Patient is not progressing towards the following goals:

## 2023-06-23 NOTE — PROGRESS NOTES
HD #3 IUP at 24 0/7wks  S) pt doing ok, +fm, no uc's - does feel anxious and can't sleep well  O) vss  Abd: gravid, nt  Ext: no edema  Nst: Twin A and B - normal baseline with min to mod variability - AGA, some decels in Twin B  A/P IUP at 24 0/7wks - mono/di twins    - suspected inappropriate placental sharing  - absent edf in twin b with iugr     - s/p steroids for flm, s/p neonatology consult    - continue observation, ua dopples 2x/wk

## 2023-06-23 NOTE — PROGRESS NOTES
0500 Report received from CELENA Hernandez. Assuming care.    0650 Report given to CELENA Quinteros.

## 2023-06-23 NOTE — NST NOTE
Vicky Guo Greg   Gestational age: 23w6d     Indication:   Mo/Di TIUP  Inappropriate placental sharing  IUGR B with AEDF    NST Interpretation:  FHT A: 150s with moderate LTV. No decles  FHT B: 150s with moderate LTV. Single decel to 120 bpm x 30 sec. Monitored for additional hour after decel. No additional seen.   [place]: no CTX    Reassuring for GA     Bailey Ortiz MD

## 2023-06-23 NOTE — NST NOTE
Vicky Garza   Gestational age: 24w0d     Indication: Mono-di twins, type II sFGR    NST Interpretation:  Baseline A Normal baseline, minimal to mod variability, AGA  B Normal baseline, minimal to mod variability, some variables, AGA    Continue BID monitoring x 1 hr

## 2023-06-23 NOTE — PROGRESS NOTES
S: No events. + FM x 2. Denies labor symptoms. Having a lot of anxiety and very worried about the babies.    O:   Vitals:    06/23/23 0610   BP: (!) 85/50   Pulse: 81   Resp: 17   Temp: 36.4 °C (97.6 °F)   SpO2:        Abd: soft, NTTP  Ext: NT, no edema    FHT: See separate NST report    A/P: Mono-di twins 24w0d, type II sFGR  Abnormal (absent ) EDF of UA Doppler in twin B in setting of FGR  S/p BMZ, full intervention   -Will continue with IP observation  -recheck UA Dopplers twice per week (will check on Sunday)  -s/p neonatology consult  -IV should be placed if any concern for fetal status on monitoring    Sugey Gonzalez M.D.

## 2023-06-24 PROCEDURE — 700102 HCHG RX REV CODE 250 W/ 637 OVERRIDE(OP): Performed by: OBSTETRICS & GYNECOLOGY

## 2023-06-24 PROCEDURE — A9270 NON-COVERED ITEM OR SERVICE: HCPCS | Performed by: OBSTETRICS & GYNECOLOGY

## 2023-06-24 PROCEDURE — 770002 HCHG ROOM/CARE - OB PRIVATE (112)

## 2023-06-24 PROCEDURE — 302790 HCHG STAT ANTEPARTUM CARE, DAILY

## 2023-06-24 RX ADMIN — PRENATAL WITH FERROUS FUM AND FOLIC ACID 1 TABLET: 3080; 920; 120; 400; 22; 1.84; 3; 20; 10; 1; 12; 200; 27; 25; 2 TABLET ORAL at 09:19

## 2023-06-24 RX ADMIN — FOLIC ACID 1 MG: 1 TABLET ORAL at 09:19

## 2023-06-24 RX ADMIN — ASPIRIN 81 MG 81 MG: 81 TABLET ORAL at 09:19

## 2023-06-24 NOTE — PROGRESS NOTES
0700 Report received from Mary DALLAS, POC discussed and care assumed.    Pt resting comfortably in bed, monitors applied x2 for daily NST. FOB at bedside. Pt v/s stable.    [facility]    Family and friends at bedside for support.    Pt ambulated off unit with friends alongside.    Report given to Mary DALLAS, POC discussed and care relinquished.

## 2023-06-24 NOTE — DISCHARGE PLANNING
Reviewed record. Noted patient is from [place] and requesting referral to lodging after delivery if twins go to NICU.  Hand off to team to assist when needed.    Patient has [employer] insurance. She had prenatal care from Dr. Michael. She is follow by high risk pregnancy clinic as well as neonatology.     Social work will follow for any needed support and resources.

## 2023-06-24 NOTE — NST NOTE
Vicky Garza   Gestational age: 24w1d     Indication: Mono-di twins, type II sFGR    NST Interpretation:  Baseline A Normal baseline, minimal to mod variability, AGA  B Normal baseline, minimal to mod variability, some variables, AGA    Continue BID monitoring x 1 hr

## 2023-06-24 NOTE — PROGRESS NOTES
MFM Progress Note    S: No events. + FM x 2. Denies labor symptoms. Doing better today from an anxiety standpoint.    O:   Vitals:    06/24/23 0815   BP:    Pulse: 81   Resp:    Temp:    SpO2: 96%     Gen: A&O, NAD  CV: well-perfused  Abd: soft, NTTP  Ext: NT, no edema    FHT: See separate NST report    A/P: Mono-di twins 24w1d, type II sFGR  Abnormal (absent) EDF of UA Doppler in twin B in setting of FGR  S/p BMZ (6/20-21), full intervention   -Will continue with IP observation  -recheck UA Dopplers twice per week (will check on Sunday)  -s/p neonatology consult  -IV should be placed if any concern for fetal status on monitoring    Sugey Gonzalez M.D.

## 2023-06-24 NOTE — PROGRESS NOTES
1900: Report received from Aldair RN, POC discussed.     1955: Initial assessment complete, pt is resting in bed and visiting family. Pt reports +FM, denies ctx, LOF or vaginal bleeding. POC for the night discussed and agreed upon per pt. Pt educated per RN to call when she is done visiting for her nightly NST. Pt verbalizes understanding. All questions and needs met at this time.     2126: MD Jackson reviewed FHT tracing. Order received to D/C monitoring.    0700: Report given to Cammy DALLAS, pt stable at time of hand off. POC discussed and agreed upon per RN's.

## 2023-06-24 NOTE — CARE PLAN
The patient is Stable - Low risk of patient condition declining or worsening    Shift Goals  Clinical Goals: healthy mom and babies  Patient Goals: remain pregant, rest  Family Goals: Support Vicky

## 2023-06-24 NOTE — PROGRESS NOTES
" PROGRESS NOTE    DATE: 2023   Hospital day: 5  Gestational Age: 24w1d, Estimated Date of Delivery: 10/13/23  Primary OB/GYN: Daysi Ríos MD   [facility] Consultant: [facility]    SUBJECTIVE  \"Adjusting to the new reality.\"  Denies CTX, VB, LOF.  Good FM x2.  No new issues.  Happy that her sciatica has improved since admission.      OBJECTIVE:  /62   Pulse 81   Temp 37.4 °C (99.3 °F) (Temporal)   Resp 16   Ht 1.702 m (5' 7\")   Wt 71.7 kg (158 lb)   SpO2 96%     Review of systems: All pertinent review of systems items are listed in the subjective section.    PHYSICAL EXAM:  General:   Alert, conversational, pleasant, no acute distress  Abdomen:  Soft, gravid, non-tender, non-distended  Ext: WWP, no edema, NTTP     FHT A: 140/mod luc/+accels, -decels  FHT B: 150/mod luc/+accels, -decels  Interpretation: Reactive for GA x2.   [place]: no CTX     Medications:   Current Facility-Administered Medications   Medication Dose Route Frequency Provider Last Rate Last Admin    folic acid (FOLVITE) tablet 1 mg  1 mg Oral DAILY Corinne E Capurro, M.D.        aspirin (ASA) chewable tab 81 mg  81 mg Oral DAILY Corinne E Capurro, M.D.        hydrOXYzine HCl (ATARAX) tablet 50 mg  50 mg Oral Q6HRS PRN Corinne E Capurro, M.D.        prenatal plus vitamin (STUARTNATAL 1+1) 27-1 MG tablet 1 Tablet  1 Tablet Oral Daily-0800 Gena Staley M.D.   1 Tablet at 23 0811        Imaging:   US (completed OP at [facility] 2023)   A  603g, 38%, AC 39%, normal UA doppler, SDP 3.2cm,  Vertex  B 474g, 3%, AC 6%, AEDF, SDP 2.9cm,  Breech     TREATMENT & PROPHYLAXIS  Steroids: Given  and   Magnesium: none currently  Antibiotics: none currently   VTE Prophylaxis : SCDs  NICU Consult: completed   GTT: not yet completed       ASSESSMENT  - 26yo  @ 24w1d   - Mo/DI TIUP  - Inappropriate placental sharing with selective growth restriction of fetus B  - IUGR B with AEDF  - Vertex/Breech " presentation    PLAN:  Mo/Di TIUP: Etiology reportedly Inappropriate placental sharing rather than TTTS given lack of CHRISTI changes. Fetal surveillance reassuring for GA.  -Repeat dopplers planned tomorrow per [facility]  -s/p ANCS and NICU consult   -NST q12h  3.   Delivery mode: Likely LTCS. Aware of potential risk of classical C/S  -Consent has been signed  4. Routine care:   -GTT will need to be completed in a week  -Has not had admission CBC, order if status change  -No IV access at current per [facility]  5. Dispo: Continue inpatient management    MDM: Risk High    Brando Smith MD, MS, FACOG, 6/24/2023, 8:55 AM

## 2023-06-25 PROCEDURE — A9270 NON-COVERED ITEM OR SERVICE: HCPCS | Performed by: OBSTETRICS & GYNECOLOGY

## 2023-06-25 PROCEDURE — 700102 HCHG RX REV CODE 250 W/ 637 OVERRIDE(OP): Performed by: OBSTETRICS & GYNECOLOGY

## 2023-06-25 PROCEDURE — 4A033BC MEASUREMENT OF ARTERIAL PRESSURE, CORONARY, PERCUTANEOUS APPROACH: ICD-10-PCS | Performed by: OBSTETRICS & GYNECOLOGY

## 2023-06-25 PROCEDURE — 4A1HXCZ MONITORING OF PRODUCTS OF CONCEPTION, CARDIAC RATE, EXTERNAL APPROACH: ICD-10-PCS | Performed by: OBSTETRICS & GYNECOLOGY

## 2023-06-25 PROCEDURE — 59025 FETAL NON-STRESS TEST: CPT

## 2023-06-25 PROCEDURE — 302790 HCHG STAT ANTEPARTUM CARE, DAILY

## 2023-06-25 PROCEDURE — 770002 HCHG ROOM/CARE - OB PRIVATE (112)

## 2023-06-25 RX ADMIN — FOLIC ACID 1 MG: 1 TABLET ORAL at 08:44

## 2023-06-25 RX ADMIN — PRENATAL WITH FERROUS FUM AND FOLIC ACID 1 TABLET: 3080; 920; 120; 400; 22; 1.84; 3; 20; 10; 1; 12; 200; 27; 25; 2 TABLET ORAL at 08:44

## 2023-06-25 RX ADMIN — ASPIRIN 81 MG 81 MG: 81 TABLET ORAL at 08:44

## 2023-06-25 NOTE — PROGRESS NOTES
0650 Report received from Mary DALLAS, POC discussed and care assumed. Pt resting comfortably in bed. V/S stable.    Monitors applied x2 for daily NST.    Dr. Ríos at bedside for discussion of POC.    Dr. Gonzalez at bedside for US.    Pt ambulated self for walk around the grounds with FOB alongside.    Pt family at bedside for support.    Report given to Mary DALLAS, POC discussed and care relinquished.

## 2023-06-25 NOTE — PROCEDURES
Twin A:   Breech maternal right  DVP 5.22 cm   bpm  UA S/D 4.94  FF DV  MCA PSV 31.1 cm/sec (1.0 MoM)  BPP 8/8    Twin B:  Vtx maternal left fundus  DVP 4.72cm   bpm  UA intermittent absent EDF  FF DV  MCA PSV 30.0 cm/sec (0.97 MoM)  BPP 8/8

## 2023-06-25 NOTE — CARE PLAN
The patient is Stable - Low risk of patient condition declining or worsening    Shift Goals  Clinical Goals: Healthy mom and babies  Patient Goals: remain pregnant, rest  Family Goals: Support Vicky

## 2023-06-25 NOTE — NST NOTE
Vicky Garza   Gestational age: 24w2d     Indication: Mono-di twins, type II sFGR     NST Interpretation:  Baseline A Normal baseline, minimal to mod variability, occasional variables, AGA  B Normal baseline, minimal to mod variability, some variables, AGA     Continue BID monitoring x 1 hr

## 2023-06-25 NOTE — CARE PLAN
The patient is Watcher - Medium risk of patient condition declining or worsening    Shift Goals  Clinical Goals: Healthy mom and babies  Patient Goals: remain pregnant, rest  Family Goals: Support Vicky    Progress made toward(s) clinical / shift goals:  Pt has support of family at bedside. Pt is independent and takes hospital strolls to improve mood and mindset. Pt calls out if anything is needed.

## 2023-06-25 NOTE — PROGRESS NOTES
MFM Progress Note    S: No events. + FM x 2. Denies labor symptoms.   O:   Vitals:    06/25/23 0734   BP:    Pulse: (!) 107   Resp:    Temp:    SpO2: 98%     Gen: A&O, NAD  CV: well-perfused  Abd: soft, NTTP  Ext: NT, no edema    FHT: See separate NST report    BSUS: See separate report.    A/P: Mono-di twins 24w2d, type II sFGR  Abnormal (absent) EDF of UA Doppler in twin B in setting of FGR  S/p BMZ (6/20-21), full intervention   -Will continue with IP observation  -recheck UA Dopplers twice per week.   6/25 - twin A normal fluid, breech maternal right, normal UA S/D ratio, normal MCA PSV; twin B normal fluid, vtx maternal left fundus, UA with intermittent AEDF in the UA, FF DV, normal MCA   -s/p neonatology consult  -IV should be placed if any concern for fetal status on monitoring    Sugey Gonzalez M.D.

## 2023-06-25 NOTE — PROGRESS NOTES
".  Labor and Delivery Antepartum Note    ID: 27 y.o. is a  with IUP at 24w2d    Primary Obstetrician: Daysi Ríos M.D.    Assessing Obstetrician: Daysi Ríos MD    S: Pt doing well, trying to cope emotionally with the probability she will be here for the duration of her pregnancy. She is feeling well. No complaints. No VB, CTX, or LOF. Both babies moving well.    ROS: 10 systems negative except as noted above.    O: /63   Pulse (!) 107   Temp 36.7 °C (98.1 °F) (Temporal)   Resp 16   Ht 1.702 m (5' 7\")   Wt 71.7 kg (158 lb)   SpO2 98%   Breastfeeding Yes Comment: plans to breastfeed  BMI 24.75 kg/m²    Gen: NAD, AAO  HEENT: NC/AT, MMM  Neck: supple  Abd: Gravid, soft, uterus NTTP, no rebound/guarding  Ext: WWP, 2+ DPP, no edema  Skin: no visible rash  Psy: mood good, affect normal and congruent  Pelvic: SVE deferred    NST Report Review:  NST: Baby A - 140s, periods of minimal and moderate variability, accels present, occas variables           Baby B - 150s, periods of minimal and moderate variability, accels present, occas variables  [place]: none    Assessment: Vicky Garza is a 27 y.o.  at 24w2d.    Mono-Di twin pregnancy.  Absent end diastolic flow and IUGR of baby B.  Reassuring NST for both babies.  S/p BMZ x 2 ( & )    Plan:  Continue antepartum management  NSTs for 30 minutes Q shift  Pt to monitor fetal movements through the day and notify us with any concerns  S/p NICU consult  Dopplers twice weekly to be done by perinatology  Place IV with any concerns on monitoring  For delivery via primary     Evaluation and clinical decision making including analysis of fetal data, imaging and maternal lab work completed over a 20 minute period.      Daysi Ríos M.D., 2023, 10:29 AM        "

## 2023-06-25 NOTE — PROGRESS NOTES
1900: Report received from Cammy DALLAS, POC discussed.     1910: Initial assessment complete, pt is visiting with family at this time. Pt reports +FM, denies ctx, LOF or vaginal bleeding. POC for the night discussed and agreed upon per pt. Pt will call RN when family leaves for NST. All questions and needs met at this time.     0650: Report given to Cammy DALLAS, pt stable at time of handoff. POC discussed and agreed upon per RN's.

## 2023-06-26 PROCEDURE — 302790 HCHG STAT ANTEPARTUM CARE, DAILY

## 2023-06-26 PROCEDURE — A9270 NON-COVERED ITEM OR SERVICE: HCPCS | Performed by: OBSTETRICS & GYNECOLOGY

## 2023-06-26 PROCEDURE — 770002 HCHG ROOM/CARE - OB PRIVATE (112)

## 2023-06-26 PROCEDURE — 700102 HCHG RX REV CODE 250 W/ 637 OVERRIDE(OP): Performed by: OBSTETRICS & GYNECOLOGY

## 2023-06-26 PROCEDURE — 59025 FETAL NON-STRESS TEST: CPT

## 2023-06-26 RX ADMIN — PRENATAL WITH FERROUS FUM AND FOLIC ACID 1 TABLET: 3080; 920; 120; 400; 22; 1.84; 3; 20; 10; 1; 12; 200; 27; 25; 2 TABLET ORAL at 08:24

## 2023-06-26 RX ADMIN — FOLIC ACID 1 MG: 1 TABLET ORAL at 08:24

## 2023-06-26 RX ADMIN — ASPIRIN 81 MG 81 MG: 81 TABLET ORAL at 08:24

## 2023-06-26 NOTE — PROGRESS NOTES
1900: Report received from Cammy DALLAS, POC discussed.     1950: Initial assessment completed. Pt is having dinner with family. Pt reports +FM, denies vaginal bleeding or LOF. POC for the night discussed with pt and pt is agreeable. Pt will call RN when she is ready for NST. All questions and needs met at this time.     0700: Report given to Ruby DALLAS, pt stable at time of handoff. POC discussed and agreed upon per RN's.

## 2023-06-26 NOTE — NST NOTE
Vicky Garza   Gestational age: 24w3d     Indication: Mono-di twins, type II sFGR     NST Interpretation:  Baseline A Normal baseline, mod variability, + accels, occasional variables, AGA  B Normal baseline, mod variability, + accels, some variables, AGA     Continue BID monitoring

## 2023-06-26 NOTE — PROGRESS NOTES
0700 Report received from Mary SALES RN and assumed care. POC discussed with pt and encouraged to state needs or questions at any time.     Frequent, large movements from both twins during monitoring. Monitors adjusted as needed.    1030 Chiki Alexander and Carlos notified of variable decel, providers reviewed tracing. Order received from Dr. Gonzalez to continue monitoring for 15 more minutes and discontinue if no more decels.    1900 Report given to Loco LAW RN, care relinquished.

## 2023-06-26 NOTE — CARE PLAN
The patient is Stable - Low risk of patient condition declining or worsening    Shift Goals  Clinical Goals: healthy mom and babies  Patient Goals: remain pregnant, rest  Family Goals: Support Vicky    Progress made toward(s) clinical / shift goals:    Problem: Knowledge Deficit - L&D  Goal: Patient and family/caregivers will demonstrate understanding of plan of care, disease process/condition, diagnostic tests and medications  Outcome: Progressing     Problem: Risk for Excess Fluid Volume  Goal: Patient will demonstrate pulse, blood pressure and neurologic signs within expected ranges and without any respiratory complications  Outcome: Progressing     Problem: Psychosocial - L&D  Goal: Patient's level of anxiety will decrease  Outcome: Progressing  Goal: Patient will be able to discuss coping skills during hospitalization  Outcome: Progressing  Goal: Patient's ability to re-evaluate and adapt role responsibilities will improve  Outcome: Progressing  Goal: Spiritual and cultural needs incorporated into hospitalization  Outcome: Progressing     Problem: Cardiac Output  Goal: Patient will remain normotensive throughout hospitalization  Outcome: Progressing     Problem: Pain  Goal: Patient's pain will be alleviated or reduced to the patient’s comfort goal  Outcome: Progressing     Problem: Risk for Fluid Imbalance  Goal: Patient's fluid volume balance will be maintained or improve  Outcome: Progressing     Problem: Risk for Infection and Impaired Wound Healing  Goal: Patient will remain free from infection  Outcome: Progressing  Note: Pt is afebrile and free from s/s of infection at this time. Will continue to assess.   Goal: Patient's wound/surgical incision will decrease in size and heals properly  Outcome: Progressing     Problem: Risk for Injury  Goal: Patient and fetus will be free of preventable injury/complications  Outcome: Progressing  Note: Fetal monitoring performed as ordered and encouraged pt to call out  with any needs, questions, or concerns as soon as they arise. Will continue to assess maternal and fetal status.      Problem: Risk for Venous Thromboembolism (VTE)  Goal: VTE prevention measures will be implemented and patient will remain free from VTE  Outcome: Progressing     Problem: Discharge Barriers/Planning  Goal: Patient's continuum of care needs are met  Outcome: Progressing       Patient is not progressing towards the following goals:

## 2023-06-26 NOTE — PROGRESS NOTES
Labor and Delivery Antepartum Note    ID: 27 y.o. is a  with IUP at 24w3d     Primary Obstetrician: Daysi Ríos M.D.     Assessing Obstetrician: Daysi Ríos MD     S: Pt doing well. Dopplers looked better yesterday so she is in good spirits.  No complaints. No VB, CTX, or LOF. Both babies moving well.     ROS: 10 systems negative except as noted above.     O:   Vitals:    23 1700 23 1950 23 0000 23 0400   BP: 102/63 111/71 99/61    Pulse: (!) 101 94 90    Resp:  17 17 16   Temp:  36.7 °C (98 °F) 36.2 °C (97.2 °F) 36.4 °C (97.5 °F)   TempSrc:  Temporal Temporal Temporal   SpO2: 97% 98% 97%    Weight:       Height:           Gen: NAD, AAO  HEENT: NC/AT, MMM  Neck: supple  Abd: Gravid, soft, uterus NTTP, no rebound/guarding  Ext: WWP, 2+ DPP, no edema  Skin: no visible rash  Psy: mood good, affect normal and congruent  Pelvic: SVE deferred     NST Report Review:  NST: Baby A - 140s, periods of minimal and moderate variability, accels present, occas variables           Baby B - 150s, periods of minimal and moderate variability, accels present, occas variables  [place]: none     Assessment: Vicky Garza is a 27 y.o.  at 24w3d.    Mono-Di twin pregnancy.  Absent end diastolic flow and IUGR of baby B.  Reassuring NST for both babies.  S/p BMZ x 2 ( & )     Plan:  Continue antepartum management  NSTs for 30 minutes Q shift  Pt to monitor fetal movements through the day and notify us with any concerns  S/p NICU consult  Dopplers twice weekly to be done by perinatology  Place IV with any concerns on monitoring  For delivery via primary      Evaluation and clinical decision making including analysis of fetal data, imaging and maternal lab work completed over a 20 minute period      Daysi Ríos M.D., 2023, 7:23 AM

## 2023-06-26 NOTE — PROGRESS NOTES
MFM Progress Note    S: No events. + FM x 2. Denies labor symptoms.     O:   Vitals:    23 0400   BP:    Pulse:    Resp: 16   Temp: 36.4 °C (97.5 °F)   SpO2:      Gen: A&O, NAD  CV: well-perfused  Abd: soft, NTTP  Ext: NT, no edema    FHT: See separate NST report    A/P: 26 yo  wiith Mono-di twins 24w3d, type II sFGR  Abnormal (absent) EDF of UA Doppler in twin B in setting of FGR (most recent  with improvement to intermittent AEDF)    -S/p BMZ (-), full intervention   -Will continue with IP observation. Consider d/c later this week if Dopplers stable and monitoring reassuring.    -recheck UA Dopplers twice per week.   -s/p neonatology consult  -IV should be placed if any concern for fetal status on monitoring    Sugey Gonzalez M.D.

## 2023-06-26 NOTE — CARE PLAN
The patient is Watcher - Medium risk of patient condition declining or worsening    Shift Goals  Clinical Goals: healthy mom and babies  Patient Goals: remain pregnant, rest  Family Goals: Support Vicky

## 2023-06-27 PROCEDURE — 700102 HCHG RX REV CODE 250 W/ 637 OVERRIDE(OP): Performed by: OBSTETRICS & GYNECOLOGY

## 2023-06-27 PROCEDURE — A9270 NON-COVERED ITEM OR SERVICE: HCPCS | Performed by: OBSTETRICS & GYNECOLOGY

## 2023-06-27 PROCEDURE — 770002 HCHG ROOM/CARE - OB PRIVATE (112)

## 2023-06-27 PROCEDURE — 302790 HCHG STAT ANTEPARTUM CARE, DAILY

## 2023-06-27 RX ADMIN — ASPIRIN 81 MG 81 MG: 81 TABLET ORAL at 08:50

## 2023-06-27 RX ADMIN — HYDROCORTISONE: 25 OINTMENT TOPICAL at 17:55

## 2023-06-27 RX ADMIN — PRENATAL WITH FERROUS FUM AND FOLIC ACID 1 TABLET: 3080; 920; 120; 400; 22; 1.84; 3; 20; 10; 1; 12; 200; 27; 25; 2 TABLET ORAL at 08:50

## 2023-06-27 RX ADMIN — FOLIC ACID 1 MG: 1 TABLET ORAL at 08:50

## 2023-06-27 NOTE — PROGRESS NOTES
1900 Report received from CELENA Beltran. POC discussed.    2140 This RN at bedside continuously to obtain FHT due to increased fetal movement, see flowsheets.     0700 Report given to CELENA Melgar. Care relinquished.

## 2023-06-27 NOTE — PROGRESS NOTES
" PROGRESS NOTE    DATE: 2023   Hospital day: 8  Gestational Age: 24w4d Estimated Date of Delivery: 10/13/23  Primary OB/GYN: Daysi Ríos MD   [facility] Consultant: [facility]    SUBJECTIVE  Feeling well overall.  Just noting stress about the situation.  Denies CTX, VB, LOF.  Good FM x2, \"both wild\".  No new issues.      OBJECTIVE:  /61   Pulse (!) 115   Temp 36.4 °C (97.5 °F) (Temporal)   Resp 17   Ht 1.702 m (5' 7\")   Wt 71.7 kg (158 lb)   SpO2 97%     Review of systems: All pertinent review of systems items are listed in the subjective section.    PHYSICAL EXAM:  General:   Alert, conversational, pleasant, no acute distress  Abdomen:  Soft, gravid, non-tender, non-distended  Ext: WWP, no edema, NTTP     NST Procedure Note:  FHT A: 140/mod luc/+accels, -decels  FHT B: 150/mod luc/+accels, one brief variable decel  Interpretation: Reactive for GA x2.   [place]: no CTX     Medications:   Current Facility-Administered Medications   Medication Dose Route Frequency Provider Last Rate Last Admin    hydrocortisone 2.5 % ointment   Topical BID Brando Smith M.D.        folic acid (FOLVITE) tablet 1 mg  1 mg Oral DAILY Corinne E Capurro, M.D.   1 mg at 23 0850    aspirin (ASA) chewable tab 81 mg  81 mg Oral DAILY Corinne E Capurro, M.D.   81 mg at 23 0850    hydrOXYzine HCl (ATARAX) tablet 50 mg  50 mg Oral Q6HRS PRN Corinne E Capurro, M.D.        prenatal plus vitamin (STUARTNATAL 1+1) 27-1 MG tablet 1 Tablet  1 Tablet Oral Daily-0800 Gena Staley M.D.   1 Tablet at 23 0850        Imaging:   US (completed OP at [facility] 2023)   A  603g, 38%, AC 39%, normal UA doppler, SDP 3.2cm,  Vertex  B 474g, 3%, AC 6%, AEDF, SDP 2.9cm,  Breech     TREATMENT & PROPHYLAXIS  Steroids: Given  and   Magnesium: none currently  Antibiotics: none currently   VTE Prophylaxis : SCDs  NICU Consult: completed   GTT: not yet completed       ASSESSMENT  - 28yo  @ 24w4d   - Mo/DI " Twin IUP  - Selective FGR B with now intermittent AEDF  - Vertex/Breech presentation    PLAN:  1. Mo/Di TIUP: Selective Fetal growth restriction in Twin B.  Last dopplers were intermittently absent which was an improvement.  Will continue to follow twice weekly dopplers.   -Repeat dopplers planned Thurs per [facility].  -s/p ANCS and NICU consult   -NST q12h  2.   Delivery mode: Likely LTCS, fetal malpresentation of Twin B. Aware of potential risk of classical C/S  -Consent has been signed  3. Routine care:   -GTT will need to be completed at earliest next week 2/2 BMZ  -Has not had admission CBC, order if status change  -No IV access at current per [facility]  4. Dispo: Continue inpatient management    MDM: Risk High    Brando Smith MD, MS, FACOG, 6/27/2023, 4:55 PM

## 2023-06-27 NOTE — PROGRESS NOTES
[facility] Progress Note    Subjective: Pt has no complaint. Report of fetal movements. Denies of contraction, vaginal bleeding or loss of fluid.    Objective Data:      Vitals:    23 1628 23 1940 23 2335 23 0748   BP: 108/59 96/56 93/54 117/56   Pulse: 91 (!) 115 80 85   Resp: 17 18 17 17   Temp: 36.9 °C (98.4 °F) 36.8 °C (98.2 °F) 36.6 °C (97.9 °F) 36.2 °C (97.1 °F)   TempSrc: Temporal Temporal Temporal Temporal   SpO2:  97%     Weight:       Height:         General: alert and oriented, in no distress.  Pulmonary: normal respiration effort.  Cardiovascular: well-perfused.  Abdomen: gravid, soft, non-tender.  Extremities: no Pernell sign.    Assessment:   IUP at 24-4/7 weeks.  Monochorionic-diamniotic twin gestation.  Fetal growth restriction (Fetus B) with abnormal UA Doppler.    Recommendations:  - Continue inpt management with close fetal surveillance.  - s/p steroid course for fetal camila maturity enhancement ().  - Already received neonatology consultation for  management.  - Repeat fetal Doppler studies this week. May consider outpt management if remains stable or improvement.  - Pt was counseled and agrees with recommendations. All of her questions were answered today.    Kvng Tompkins MD

## 2023-06-27 NOTE — CARE PLAN
The patient is Watcher - Medium risk of patient condition declining or worsening    Shift Goals  Clinical Goals: Patient safety  Patient Goals: healthy mom, healthy baby  Family Goals: support    Progress made toward(s) clinical / shift goals:    Problem: Risk for Injury  Goal: Patient and fetus will be free of preventable injury/complications  Outcome: Progressing  Note: EFM done per orders.     Problem: Risk for Venous Thromboembolism (VTE)  Goal: VTE prevention measures will be implemented and patient will remain free from VTE  Outcome: Progressing  Note: Pt ambulating frequently       Patient is not progressing towards the following goals: NA

## 2023-06-27 NOTE — PROGRESS NOTES
0700 - Report received from CELENA Adan.  0854 - Monitors on x2. Pt reports +FM, denies LOF, UCs or VB. POC discussed and questions addressed. Pt denies needs at this time. Will monitor.  1900 - Report to CELENA Adan.

## 2023-06-27 NOTE — CARE PLAN
The patient is Stable - Low risk of patient condition declining or worsening    Shift Goals  Clinical Goals: Healthy mom, healthy babies  Patient Goals: Healthy mom, healthy babies  Family Goals: healthy mom, healthy babies    Progress made toward(s) clinical / shift goals:  Pt with support systems at beginning of shift. VSS     Patient is not progressing towards the following goals:      Problem: Psychosocial - L&D  Goal: Patient will be able to discuss coping skills during hospitalization  Outcome: Progressing     Problem: Cardiac Output  Goal: Patient will remain normotensive throughout hospitalization  Outcome: Progressing

## 2023-06-27 NOTE — NST NOTE
Vicky Guopascale Garza   Gestational age: 24w4d     Indication:  Monochorionic-diamniotic twins.  Fetal growth restriction with abnormal UA Doppler studies.    Nonstress Test Report    Twin A:  Baseline 130s,   NST: Accelerations present, moderate variability, no deceleration    Twin B:  Baseline 130s,   NST: Accelerations present, moderate variability, no deceleration    CTX: None.    Impression: Appropriate for gestational age.  Recommendations: NST Q maggie.    JENNIE Tompkins MD

## 2023-06-28 PROCEDURE — A9270 NON-COVERED ITEM OR SERVICE: HCPCS | Performed by: OBSTETRICS & GYNECOLOGY

## 2023-06-28 PROCEDURE — 700102 HCHG RX REV CODE 250 W/ 637 OVERRIDE(OP): Performed by: OBSTETRICS & GYNECOLOGY

## 2023-06-28 PROCEDURE — 302790 HCHG STAT ANTEPARTUM CARE, DAILY

## 2023-06-28 PROCEDURE — 770002 HCHG ROOM/CARE - OB PRIVATE (112)

## 2023-06-28 RX ADMIN — ASPIRIN 81 MG 81 MG: 81 TABLET ORAL at 08:05

## 2023-06-28 RX ADMIN — HYDROCORTISONE: 25 OINTMENT TOPICAL at 09:45

## 2023-06-28 RX ADMIN — PRENATAL WITH FERROUS FUM AND FOLIC ACID 1 TABLET: 3080; 920; 120; 400; 22; 1.84; 3; 20; 10; 1; 12; 200; 27; 25; 2 TABLET ORAL at 08:05

## 2023-06-28 RX ADMIN — FOLIC ACID 1 MG: 1 TABLET ORAL at 08:05

## 2023-06-28 ASSESSMENT — PATIENT HEALTH QUESTIONNAIRE - PHQ9
1. LITTLE INTEREST OR PLEASURE IN DOING THINGS: NOT AT ALL
SUM OF ALL RESPONSES TO PHQ9 QUESTIONS 1 AND 2: 0
2. FEELING DOWN, DEPRESSED, IRRITABLE, OR HOPELESS: NOT AT ALL

## 2023-06-28 NOTE — PROGRESS NOTES
0700 - Report received from CELENA Adan.  0800 - Pt denies LOF/ VB / Ucs/ pain. Pt reports feeling active FM from both twins.  1900 - This RN updated Dr. Rivera on pt status. MD to Pt bedside to discuss POC. Report given to CELENA Hernandez.

## 2023-06-28 NOTE — PROGRESS NOTES
1900 Report received from CELENA Melgar. POC discussed.     1910 Pt visiting with family at this time.     2128 RN continuously bedside to evaluate FHR's, difficult to monitor, will attempt again.     2328 Two RN's at bedside for continuous EFM due to increased fetal movement, see flowsheets.     0700 Report given to CELENA Cruz. Care relinquished.

## 2023-06-28 NOTE — NST NOTE
Vicky Garza   Gestational age: 24w5d     Indication:  Monochorionic-diamniotic twins.  Fetal growth restriction with abnormal UA Doppler studies.     Nonstress Test Report     Twin A:  Baseline 130s,   NST: Accelerations present, moderate variability, no deceleration     Twin B:  Baseline 130s,   NST: Accelerations present, moderate variability, no deceleration     CTX: None.     Impression: Appropriate for gestational age.  Recommendations: NST Q shift.    Kvng Tompkins MD

## 2023-06-28 NOTE — PROGRESS NOTES
[facility] Progress Note    Subjective: Pt has no complaint. Report of fetal movements. Denies of contraction, vaginal bleeding or loss of fluid.    Objective Data:    Vitals:    23 0000 23 0350 23 0741 23 0742   BP: 105/55 (!) 89/50 105/60    Pulse: 84 90 91 91   Resp: 16 16     Temp: 36.3 °C (97.4 °F) 36.3 °C (97.3 °F) 36.2 °C (97.1 °F)    TempSrc: Temporal Temporal Temporal    SpO2:    98%   Weight:       Height:           No intake or output data in the 24 hours ending 23 0757    Current Facility-Administered Medications   Medication Dose Route Frequency Provider Last Rate Last Admin    hydrocortisone 2.5 % ointment   Topical BID PRN Brando Smith M.D.        folic acid (FOLVITE) tablet 1 mg  1 mg Oral DAILY Corinne E Capurro, M.D.   1 mg at 23 0850    aspirin (ASA) chewable tab 81 mg  81 mg Oral DAILY Corinne E Capurro, M.D.   81 mg at 23 0850    hydrOXYzine HCl (ATARAX) tablet 50 mg  50 mg Oral Q6HRS PRN Corinne E Capurro, M.D.        prenatal plus vitamin (STUARTNATAL 1+1) 27-1 MG tablet 1 Tablet  1 Tablet Oral Daily-0800 Gena Staley M.D.   1 Tablet at 23 0850     General: alert and oriented, in no distress.  Pulmonary: normal respiration effort.  Cardiovascular: well-perfused.  Abdomen: gravid, soft, non-tender.  Extremities: no Pernell sign.    Assessment:   IUP at 24-5/7 weeks.  Monochorionic-diamniotic twin gestation.  Fetal growth restriction (Fetus B) with abnormal UA Doppler.     Recommendations:  - Continue inpt management with close fetal surveillance.  - s/p steroid course for fetal camila maturity enhancement ().  - Already received neonatology consultation for  management.  - Repeat fetal Doppler studies this week. May consider outpt management if remains stable or improvement.  - Pt was counseled and agrees with recommendations. All of her questions were answered today.     Kvng Tompkins MD

## 2023-06-28 NOTE — PROGRESS NOTES
antepartum Progress Note    Vicky Garza   24w5d      Subjective:  Pt without complaints today. No leaking fluid, both moving well, denies ctns.   Earlier I was informed her NST baby b was Not reactive but in review of tracing It was appropriate for gestational age.     Objective:   Vitals:    23 0350 23 0741 23 0742 23 1200   BP: (!) 89/50 105/60  115/72   Pulse: 90 91 91 98   Resp: 16   16   Temp: 36.3 °C (97.3 °F) 36.2 °C (97.1 °F)  36.8 °C (98.2 °F)   TempSrc: Temporal Temporal  Temporal   SpO2:   98% 98%   Weight:       Height:         Gen: no acute distress  Abd: gravid non tender  Right leg: sore/blisters now gone where her first IM steroid was given but still looks red/angry  Ext: no calf pain walter LE    Nst:  Baby A and Baby B both AGA, accels, moderate variability  [place] : none    Assessment:     26yo  @ 24w5d   - Mo/DI Twin IUP  - Selective FGR B with now intermittent AEDF  - Vertex/Breech presentation     PLAN:  1.       Mo/Di TIUP: Selective Fetal growth restriction in Twin B.  Last dopplers were intermittently absent which was an improvement.  Will continue to follow twice weekly dopplers.   -Repeat dopplers planned Thurs per M.  -s/p ANCS and NICU consult   -NST q12h  2.   Delivery mode: Likely LTCS, fetal malpresentation of Twin B. Aware of potential risk of classical C/S  -Consent has been signed  3. Routine care:   -GTT will need to be completed at earliest next week 2/2 BMZ  -Has not had admission CBC, order if status change  -No IV access at current per [facility]  4. Dispo: Continue inpatient management       Amira Youssef M.D.

## 2023-06-28 NOTE — CARE PLAN
The patient is Stable - Low risk of patient condition declining or worsening    Shift Goals  Clinical Goals: healthy mom and babies  Patient Goals: healthy babies  Family Goals: emotional support      Problem: Knowledge Deficit - L&D  Goal: Patient and family/caregivers will demonstrate understanding of plan of care, disease process/condition, diagnostic tests and medications  Outcome: Progressing   POC discussed with pt. Pt verbalizes understanding and asks questions as needed.     Problem: Psychosocial - L&D  Goal: Patient's level of anxiety will decrease  Outcome: Progressing    Encouraged patient participation in POC. Collaborated with interdisciplinary team and update pt in order for her to make informed decisions in her care. Allowed ample time for pt to ask questions and clarify education topics.

## 2023-06-29 VITALS
OXYGEN SATURATION: 98 % | HEART RATE: 89 BPM | HEIGHT: 67 IN | TEMPERATURE: 98.3 F | DIASTOLIC BLOOD PRESSURE: 63 MMHG | SYSTOLIC BLOOD PRESSURE: 107 MMHG | BODY MASS INDEX: 24.8 KG/M2 | WEIGHT: 158 LBS | RESPIRATION RATE: 17 BRPM

## 2023-06-29 PROCEDURE — 700102 HCHG RX REV CODE 250 W/ 637 OVERRIDE(OP): Performed by: OBSTETRICS & GYNECOLOGY

## 2023-06-29 PROCEDURE — A9270 NON-COVERED ITEM OR SERVICE: HCPCS | Performed by: OBSTETRICS & GYNECOLOGY

## 2023-06-29 RX ADMIN — FOLIC ACID 1 MG: 1 TABLET ORAL at 09:28

## 2023-06-29 RX ADMIN — ASPIRIN 81 MG 81 MG: 81 TABLET ORAL at 09:28

## 2023-06-29 RX ADMIN — HYDROCORTISONE: 25 OINTMENT TOPICAL at 09:29

## 2023-06-29 RX ADMIN — PRENATAL WITH FERROUS FUM AND FOLIC ACID 1 TABLET: 3080; 920; 120; 400; 22; 1.84; 3; 20; 10; 1; 12; 200; 27; 25; 2 TABLET ORAL at 09:28

## 2023-06-29 ASSESSMENT — PAIN DESCRIPTION - PAIN TYPE
TYPE: ACUTE PAIN
TYPE: ACUTE PAIN

## 2023-06-29 NOTE — PROGRESS NOTES
1900: Report received from Nancy DALLAS, POC discussed.     2014: Initial assessment complete. Pt is laying comfortably in bed with no complaints at this time. Pt reports + FM, denies vaginal bleeding or LOF. POC for the night discussed and agreed per pt. All questions and needs met at this time.     0710: Report given to Jojo DALLAS, pt stable at time of handoff. POC discussed and agreed upon per RN's.

## 2023-06-29 NOTE — NST NOTE
Vicky Guopascale Garza   Gestational age: 24w6d     Nonstress Test Report    Indication:  Monochorionic-diamniotic twins.  Fetal growth restriction with abnormal UA Doppler studies.     Nonstress Test Report     Twin A:  Baseline 130s,   NST: Accelerations present, moderate variability, no deceleration     Twin B:  Baseline 130s,   NST: Accelerations present, moderate variability, no deceleration     CTX: None.     Impression: Reactive NST for both twins. Appropriate for gestational age.    Recommendations: NST Q shift. Being done this morning.    Kvng Tompkins MD

## 2023-06-29 NOTE — DISCHARGE SUMMARY
discharge Summary    Admission Date: 2023         Discharge Date: 2023    ADMISSION DIAGNOSIS:  1.   monochorionic diamniotic twin gestation at 24+6 weeks.  2.  Fetal growth restriction of twin B with abnormal umbilical artery dopplers.    DISCHARGE DIAGNOSIS:  1.   monochorionic diamniotic twin gestation at 24+6 weeks.  2.  Fetal growth restriction of twin B with abnormal umbilical artery dopplers.    DETAILS OF HOSPITAL STAY  Presenting Problem/History of Present Illness: Abnormal umbilical artery Dopplers for twin B.    Hospital Course:  The patient is a 27-year-old  3 para 2-0-0-2 who presented to [facility] at 24w6d with a chief complaint of: Fetal growth restriction of twin B and absent end-diastolic flow. She has prenatal care with [facility] with Dr. Ríos.  Her pregnancy was complicated by: Monochorionic diamniotic twin gestation.  She was seen at the high risk pregnancy center on  and twin B was noted to have fetal growth restriction with an estimated fetal weight in the 3rd percentile, abdominal circumference in the 6 percentile.  Twin B was also noted to have absent end-diastolic flow.  Because of this the patient was sent to labor and delivery for inpatient management.  The patient is status post betamethasone for fetal lung maturity.  The patient had an uncomplicated hospital course and was seen by Dr. Tompkins yesterday evening.  Twin B has intermittent absent end-diastolic flow in sections of the umbilical cord per Dr. Tompkins but is stable for outpatient management.  The patient did have an NST this morning and twin B was noted to be tachycardic in the 160s to 170s.  She had a repeat NST this afternoon and twin A and twin B both have reactive and reassuring fetal heart tracings for 24-week twins.  Per [facility] the patient is stable for outpatient management and she will follow-up with them on Monday for further monitoring.   Biophysical profiles for twin A and twin B yesterday were 8 out of 8 x 2.  Growth ultrasound yesterday for twin A showed an estimated fetal weight in the 40th percentile and for twin B an estimated fetal weight in the 19th percentile.  The patient feels stable for discharge home and reports that she feels fetal movement distinctly for twin A and twin B.  She reports that twin B is actually the more active twin.  She has no concerns and desires discharge home.    COMPLICATIONS: None.    PHYSICAL EXAM:  Vitals:   Vitals:    23 1121   BP: 107/63   Pulse: 89   Resp: 17   Temp: 36.8 °C (98.3 °F)   SpO2:    General: Alert, conversational, pleasant, no acute distress  CVS: Regular rate  PULM: No respiratory distress, symmetric expansion  ABD: Soft, non-tender, non-distended, fundus firm, non-tender, below the umbilicus  : Deferred  Extremities: Moves all, trace edema     LABS/STUDIES: n/a    DISPOSITION: Home.    DISCHARGE MEDICATIONS: None.    DISCHARGE INSTRUCTIONS:  1.  Follow-up with [facility] on Monday for  monitoring.  The patient reports that she will plan to have  monitoring on  and  at [facility].  She will follow-up with Dr. Ríos as well.  2.  Fetal kick count precautions discussed.  3.   labor precautions discussed.    DISCHARGE CONDITION: Stable.    Trinidad Licona M.D.

## 2023-06-29 NOTE — PROGRESS NOTES
0710- Report received from Mary. POC discussed and assumed.   0730- Assessment completed. Pt reports +FM and denies feeling any contractions, LOF or VB. Silver dollar size red irritated [place] present to right leg where pt reports her betamethasone shot was given. Pt states it hasn't gotten worse but its not much better. Pt states it is still a bit itchy. Will bring in topical Cortizone PRN. Pt is hopeful she can be discharged home today after her NST's.   0836- EFM and TOCO in place for monitoring.   0848- DR Tompkins on unit and notified of FHR for baby B in the 170's. He is not very concerned at this time.   1000- discussed fetal strip with Dr Licona. Due to baby B being tachy in the 170's for extent of  monitoring, she would like pt to be monitored again at 1400 to assess fetal baseline at that time. Pt made aware of plan.   1425- EFM and TOCO in place for monitoring.   1505- Monitors removed. Fetal baseline for baby A is 135, B is 150. Dr Licona will be on unit shortly to review and discharge pt home.   1545- Discharge instructions provided with labor precautions. All questions answered at this time. Pt discharged home with FOB.

## 2023-06-29 NOTE — PROGRESS NOTES
[facility] Ultrasound Report    Indications:  Monochorionic-diamniotic twins.  Fetal growth restriction with abnormal Doppler sudies.    Findings:    Monochorionic-diamniotic twins.  Free-floating membrane visualized.  Fundal-anterior placenta, no previa or retroplacental clot.    TWIN A:  Vertex presenting.  Maternal-left lower-uterine segment.  EGA 24-5/7 weeks.   gms (1 lb, 8 oz), 40th percentile.  Deepest vertical fluid pocket 2.8 cm.  Fetal anatomic survey appears normal, but the examination was limited due to fetal position and equipment.  UA Doppler S/D ratio of 4.2.  BPP 8/8.    TWIN B:  Transverse presenting.  Maternal-right uterine fundus.  EGA 23-1/7 weeks.    gms (1 lb, 2 oz), 19th percentile.  Deepest vertical fluid pocket 2.7 cm.  Fetal anatomic survey appears normal, but the examination was limited due to fetal position and equipment.  UA Doppler S/D ratio of 6.8 to 10.9, with intermittent absent end-diastolic flow (AEDF).  BPP 8/8.    Impression:  Abnormal UA Doppler flow studies for Twin B, although improvement from previous examination.    Recommendations:  Continue close fetal surveillance with NST Q shift. Consider outpt tomorrow if reassuring NST, with twice weekly BPP and Doppler studies. Pt's counseled and agrees with recommendations.    Kvng Tompkins MD

## 2023-06-29 NOTE — CARE PLAN
The patient is Stable - Low risk of patient condition declining or worsening    Shift Goals  Clinical Goals: healthy mom and babies  Patient Goals: remain pregnant, possibly go home  Family Goals: Support Vicky

## 2023-06-29 NOTE — PROGRESS NOTES
[facility] Progress Note    Subjective: Pt has no complaint. Report of fetal movements. Denies of contraction, vaginal bleeding or loss of fluid.    Objective Data:    Vitals:    23 1700 23 2335 23 0530   BP: 121/57 109/67 106/58 93/54   Pulse: 99 98 84 86   Resp:  16 16 14   Temp:  36.5 °C (97.7 °F) 36.5 °C (97.7 °F) 36.6 °C (97.8 °F)   TempSrc:  Temporal Temporal Temporal   SpO2: 98%      Weight:       Height:         No intake or output data in the 24 hours ending 23 0837    Current Facility-Administered Medications   Medication Dose Route Frequency Provider Last Rate Last Admin    hydrocortisone 2.5 % ointment   Topical BID PRN Brando Smith M.D.   Given at 23 0945    folic acid (FOLVITE) tablet 1 mg  1 mg Oral DAILY Corinne E Capurro, M.D.   1 mg at 23 0805    aspirin (ASA) chewable tab 81 mg  81 mg Oral DAILY Corinne E Capurro, M.D.   81 mg at 23 0805    hydrOXYzine HCl (ATARAX) tablet 50 mg  50 mg Oral Q6HRS PRN Corinne E Capurro, M.D.        prenatal plus vitamin (STUARTNATAL 1+1) 27-1 MG tablet 1 Tablet  1 Tablet Oral Daily-0800 Gena Staley M.D.   1 Tablet at 23 0805     General: alert and oriented, in no distress.  Pulmonary: normal respiration effort.  Cardiovascular: well-perfused.  Abdomen: gravid, soft, non-tender.  Extremities: no Pernell sign.    Assessment:   IUP at 24-6/7 weeks.  Monochorionic-diamniotic twin gestation.  Fetal growth restriction (Fetus B) with abnormal UA Doppler.    Recommendations:  - Continue close fetal surveillance.  - s/p steroid course for fetal camila maturity enhancement ().  - Already received neonatology consultation for  management.  - Repeat fetal Doppler studies last PM remains stable with improved S/D ratio but still with intermittent AEDF (Twin B).  - If pt remains stable with reassuring NST today, ay consider outpt management with twice weekly  fetal testing, including  fetal Doppler studies.  - Pt was counseled and agrees with recommendations. All of her questions were answered today.    Kvng Tompkins MD

## 2023-07-06 ENCOUNTER — HOSPITAL ENCOUNTER (INPATIENT)
Facility: MEDICAL CENTER | Age: 28
LOS: 22 days | End: 2023-07-28
Attending: OBSTETRICS & GYNECOLOGY | Admitting: OBSTETRICS & GYNECOLOGY
Payer: COMMERCIAL

## 2023-07-06 DIAGNOSIS — G89.18 ACUTE POST-OPERATIVE PAIN: ICD-10-CM

## 2023-07-06 PROBLEM — O30.032 MONOCHORIONIC DIAMNIOTIC TWIN PREGNANCY IN SECOND TRIMESTER: Status: ACTIVE | Noted: 2023-07-06

## 2023-07-06 LAB
ABO GROUP BLD: NORMAL
ALBUMIN SERPL BCP-MCNC: 3.8 G/DL (ref 3.2–4.9)
ALBUMIN/GLOB SERPL: 1.8 G/DL
ALP SERPL-CCNC: 74 U/L (ref 30–99)
ALT SERPL-CCNC: 10 U/L (ref 2–50)
ANION GAP SERPL CALC-SCNC: 15 MMOL/L (ref 7–16)
AST SERPL-CCNC: 8 U/L (ref 12–45)
BASOPHILS # BLD AUTO: 0.4 % (ref 0–1.8)
BASOPHILS # BLD: 0.05 K/UL (ref 0–0.12)
BILIRUB SERPL-MCNC: 0.7 MG/DL (ref 0.1–1.5)
BLD GP AB SCN SERPL QL: NORMAL
BUN SERPL-MCNC: 12 MG/DL (ref 8–22)
CALCIUM ALBUM COR SERPL-MCNC: 9.4 MG/DL (ref 8.5–10.5)
CALCIUM SERPL-MCNC: 9.2 MG/DL (ref 8.5–10.5)
CHLORIDE SERPL-SCNC: 104 MMOL/L (ref 96–112)
CO2 SERPL-SCNC: 19 MMOL/L (ref 20–33)
CREAT SERPL-MCNC: 0.36 MG/DL (ref 0.5–1.4)
EOSINOPHIL # BLD AUTO: 0.13 K/UL (ref 0–0.51)
EOSINOPHIL NFR BLD: 1.1 % (ref 0–6.9)
ERYTHROCYTE [DISTWIDTH] IN BLOOD BY AUTOMATED COUNT: 40.3 FL (ref 35.9–50)
GFR SERPLBLD CREATININE-BSD FMLA CKD-EPI: 140 ML/MIN/1.73 M 2
GLOBULIN SER CALC-MCNC: 2.1 G/DL (ref 1.9–3.5)
GLUCOSE SERPL-MCNC: 79 MG/DL (ref 65–99)
HCT VFR BLD AUTO: 36.4 % (ref 37–47)
HGB BLD-MCNC: 12.3 G/DL (ref 12–16)
IMM GRANULOCYTES # BLD AUTO: 0.09 K/UL (ref 0–0.11)
IMM GRANULOCYTES NFR BLD AUTO: 0.8 % (ref 0–0.9)
LYMPHOCYTES # BLD AUTO: 1.86 K/UL (ref 1–4.8)
LYMPHOCYTES NFR BLD: 16.1 % (ref 22–41)
MAGNESIUM SERPL-MCNC: 1.7 MG/DL (ref 1.5–2.5)
MAGNESIUM SERPL-MCNC: 3.9 MG/DL (ref 1.5–2.5)
MCH RBC QN AUTO: 30.1 PG (ref 27–33)
MCHC RBC AUTO-ENTMCNC: 33.8 G/DL (ref 32.2–35.5)
MCV RBC AUTO: 89 FL (ref 81.4–97.8)
MONOCYTES # BLD AUTO: 0.54 K/UL (ref 0–0.85)
MONOCYTES NFR BLD AUTO: 4.7 % (ref 0–13.4)
NEUTROPHILS # BLD AUTO: 8.9 K/UL (ref 1.82–7.42)
NEUTROPHILS NFR BLD: 76.9 % (ref 44–72)
NRBC # BLD AUTO: 0 K/UL
NRBC BLD-RTO: 0 /100 WBC (ref 0–0.2)
PLATELET # BLD AUTO: 208 K/UL (ref 164–446)
PMV BLD AUTO: 10.3 FL (ref 9–12.9)
POTASSIUM SERPL-SCNC: 3.8 MMOL/L (ref 3.6–5.5)
PROT SERPL-MCNC: 5.9 G/DL (ref 6–8.2)
RBC # BLD AUTO: 4.09 M/UL (ref 4.2–5.4)
RH BLD: NORMAL
SODIUM SERPL-SCNC: 138 MMOL/L (ref 135–145)
T PALLIDUM AB SER QL IA: NORMAL
WBC # BLD AUTO: 11.6 K/UL (ref 4.8–10.8)

## 2023-07-06 PROCEDURE — 302449 STATCHG TRIAGE ONLY (STATISTIC)

## 2023-07-06 PROCEDURE — 700105 HCHG RX REV CODE 258: Mod: JZ | Performed by: OBSTETRICS & GYNECOLOGY

## 2023-07-06 PROCEDURE — 302790 HCHG STAT ANTEPARTUM CARE, DAILY

## 2023-07-06 PROCEDURE — 83735 ASSAY OF MAGNESIUM: CPT | Mod: 91

## 2023-07-06 PROCEDURE — 85025 COMPLETE CBC W/AUTO DIFF WBC: CPT

## 2023-07-06 PROCEDURE — 700111 HCHG RX REV CODE 636 W/ 250 OVERRIDE (IP): Performed by: OBSTETRICS & GYNECOLOGY

## 2023-07-06 PROCEDURE — 80053 COMPREHEN METABOLIC PANEL: CPT

## 2023-07-06 PROCEDURE — 36415 COLL VENOUS BLD VENIPUNCTURE: CPT

## 2023-07-06 PROCEDURE — 86850 RBC ANTIBODY SCREEN: CPT

## 2023-07-06 PROCEDURE — 86901 BLOOD TYPING SEROLOGIC RH(D): CPT

## 2023-07-06 PROCEDURE — 86900 BLOOD TYPING SEROLOGIC ABO: CPT

## 2023-07-06 PROCEDURE — 770002 HCHG ROOM/CARE - OB PRIVATE (112)

## 2023-07-06 PROCEDURE — 86780 TREPONEMA PALLIDUM: CPT

## 2023-07-06 RX ORDER — SODIUM CHLORIDE, SODIUM LACTATE, POTASSIUM CHLORIDE, CALCIUM CHLORIDE 600; 310; 30; 20 MG/100ML; MG/100ML; MG/100ML; MG/100ML
INJECTION, SOLUTION INTRAVENOUS CONTINUOUS
Status: DISCONTINUED | OUTPATIENT
Start: 2023-07-06 | End: 2023-07-24

## 2023-07-06 RX ORDER — CALCIUM GLUCONATE 94 MG/ML
1 INJECTION, SOLUTION INTRAVENOUS
Status: DISCONTINUED | OUTPATIENT
Start: 2023-07-06 | End: 2023-07-07

## 2023-07-06 RX ORDER — BETAMETHASONE SODIUM PHOSPHATE AND BETAMETHASONE ACETATE 3; 3 MG/ML; MG/ML
12 INJECTION, SUSPENSION INTRA-ARTICULAR; INTRALESIONAL; INTRAMUSCULAR; SOFT TISSUE EVERY 24 HOURS
Status: COMPLETED | OUTPATIENT
Start: 2023-07-06 | End: 2023-07-07

## 2023-07-06 RX ORDER — MAGNESIUM SULFATE HEPTAHYDRATE 40 MG/ML
2 INJECTION, SOLUTION INTRAVENOUS CONTINUOUS
Status: DISCONTINUED | OUTPATIENT
Start: 2023-07-06 | End: 2023-07-07

## 2023-07-06 RX ADMIN — MAGNESIUM SULFATE IN WATER 2 G/HR: 40 INJECTION, SOLUTION INTRAVENOUS at 15:05

## 2023-07-06 RX ADMIN — BETAMETHASONE SODIUM PHOSPHATE AND BETAMETHASONE ACETATE 12 MG: 3; 3 INJECTION, SUSPENSION INTRA-ARTICULAR; INTRALESIONAL; INTRAMUSCULAR at 15:33

## 2023-07-06 RX ADMIN — SODIUM CHLORIDE, POTASSIUM CHLORIDE, SODIUM LACTATE AND CALCIUM CHLORIDE: 600; 310; 30; 20 INJECTION, SOLUTION INTRAVENOUS at 15:03

## 2023-07-06 ASSESSMENT — LIFESTYLE VARIABLES: EVER_SMOKED: NEVER

## 2023-07-06 ASSESSMENT — PATIENT HEALTH QUESTIONNAIRE - PHQ9
SUM OF ALL RESPONSES TO PHQ9 QUESTIONS 1 AND 2: 0
SUM OF ALL RESPONSES TO PHQ9 QUESTIONS 1 AND 2: 0
2. FEELING DOWN, DEPRESSED, IRRITABLE, OR HOPELESS: NOT AT ALL
2. FEELING DOWN, DEPRESSED, IRRITABLE, OR HOPELESS: NOT AT ALL
1. LITTLE INTEREST OR PLEASURE IN DOING THINGS: NOT AT ALL
1. LITTLE INTEREST OR PLEASURE IN DOING THINGS: NOT AT ALL

## 2023-07-06 ASSESSMENT — PAIN SCALES - GENERAL: PAINLEVEL: 0 - NO PAIN

## 2023-07-06 NOTE — CONSULTS
MATERNAL FETAL MEDICINE CONSULTATION    Vicky Downing is admitted as a 27-year-old  3 para 2 () currently at 25-6/7 weeks based upon a due date of 2023.  The patient was sent over from the office with monochorionic diamniotic twin gestation.  There has been selective fetal growth restriction.  At the office today we have for the first time persistent reversed end-diastolic flow.  3 days ago the umbilical artery Doppler had intermittent absent end-diastolic flow.    Both twins were moving well.  There is no evidence of twin to twin transfusion syndrome.  The amniotic fluid was equal between the twins and the bladders were equally visualized.     The family has been counseled throughout regarding the risk for the early severe growth restriction.   the presenting baby was at the 44th percentile and is in a breech to transverse position.  The upper baby was at less than 3rd percentile with symmetrically small growth.  Amniotic fluid volume was normal.    The estimated fetal weight of the presenting transverse fetus is 1 pounds 15 ounces, 883 g).  The upper transverse baby is 666 g (1 pound 7 ounces).  The placentas are posterior.  There is no placenta previa.    We discussed that the finding of reversed end-diastolic flow predicts a minimal amount of time prior to possible fetal distress and need for urgent delivery.  We discussed that reverse end-diastolic flow increases the risk for fetal loss.  We discussed these the shared circulation and monochorionic twins makes fetal loss dangerous for both twins, including the healthier appearing twin.We therefore are recommending admission.    Plan:    1.  NICU consultation.    2.  Betamethasone for fetal lung maturation.    3.  24 hours of magnesium sulfate for fetal neuro protection.    4.  Initially continuous monitoring looking for evidence of spontaneous decelerations.    5.  Mode of delivery would be  section.    The family's questions were  answered in the office.  I will see the patient daily.  We will repeat the umbilical artery Doppler twice a week and as needed.      Medications: Low-dose aspirin, prenatal vitamin, folic acid, calcium.    Past  Medical history:    1 anxiety with depression    2 intermittent headaches    Patient denies diabetes, hypertension, asthma, thyroid disease, collagen vascular disease, or thrombosis.  .    Surgical history: Denies     Family history denies significant inherited disease or teratogen exposure.      Social history: Patient denies smoking, alcohol, or drug use.  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  Her 's name is Min.      Obstetrical history: EDC 10/13/2023.   3 para 2 ().    Pregnancy #1: : 39 weeks, 7 pounds 8 ounces, spontaneous vaginal delivery without complication     pregnancy #2: : 39-week, 7 pounds 12 ounces, spontaneous vaginal delivery without complication.      Allergies no known drug allergies    PHYSICAL EXAM:   Vitals:    23 1230 23 1247 23 1415 23 1515   BP: 124/73 124/73  103/55   Pulse: (!) 102 (!) 105 95 83   Resp:  15  15   Temp:  36.4 °C (97.5 °F)     TempSrc:  Temporal     SpO2: 94% 95% 94% 95%   Weight:       Height:             LABS:  Recent Labs     23  1330   WBC 11.6*   RBC 4.09*   HEMOGLOBIN 12.3   HEMATOCRIT 36.4*   MCV 89.0   MCH 30.1   MCHC 33.8   RDW 40.3   PLATELETCT 208   MPV 10.3      Recent Labs     23  1330   SODIUM 138   POTASSIUM 3.8   CHLORIDE 104   CO2 19*   GLUCOSE 79   BUN 12   CREATININE 0.36*   CALCIUM 9.2       Giovani Ko M.D.

## 2023-07-06 NOTE — H&P
"Department of Obstetrics and Gynecology  Labor and Delivery History and Physical    Date of Admission: 2023     ID: 27 y.o.  with IUP at 25w6d    Primary OB: Daysi Ríos M.D.     Attending OB: Bailey Ortiz MD    CC: referred from [facility] for admission    HPI: Vicky Garza is a 27 y.o.  @ 25w6d with a Mo-Di TIUP, complicated by selective IUGR of Fetus B and reversed EDF. Two days ago, this fetus was noted to have intermittent AEDF.      She is tearful and nervous. Presents with her . Feeling good FM. Denies CTX, LOF/VB.     ROS: 10 systems reviewed and negative except as noted above.    Obstetric History    - 2019, term , male  G2 - , term , female  G3 - Current pregnancy         Past Medical History  Surgical History   Asthma   None      Gynecologic History  Social History   regular menses prior to pregnancy  denies Hx of abnormal pap smears.  denies Hx of STIs  Denies Hx of HSV Tobacco: denies  EtOH: denies  Street Drugs: denies   is Min.   They are expecting two boys      Medications  Allergies   No current facility-administered medications on file prior to encounter.     Current Outpatient Medications on File Prior to Encounter   Medication Sig Dispense Refill    Prenatal Vit-Fe Fumarate-FA (PRENATAL PO) Take 2 Capsules by mouth every day.      aspirin 81 MG EC tablet Take 81 mg by mouth every day.      Folic Acid (FOLATE PO) Take 1 Tablet by mouth every day.      CALCIUM PO Take 1 Tablet by mouth every day.      Patient has no known allergies.       O: /73   Pulse (!) 105   Temp 36.4 °C (97.5 °F) (Temporal)   Resp 15   Ht 1.702 m (5' 7\")   Wt 71.7 kg (158 lb)   SpO2 95%   BMI 24.75 kg/m²     Gen: NAD, AAO  Abd: Gravid, NTTP,Cephalic by [place], No rebound or guarding  Ext: NTTP, no edema, 2+DPP  Pelvic: deferred     FHT A: 150s with moderate LTV. No decels  FHT B: 150s with moderate LTV. No decels  [place]: irritability    Labs: "   Admission on 2023, Discharged on 2023   Component Date Value Ref Range Status    ABO Grouping Only 2023 A   Final    Rh Grouping Only 2023 POS   Final    Antibody Screen-Cod 2023 NEG   Final    ABO Rh Confirm 2023 A POS   Final       Prenatal labs:  Blood Type: A positive  AST: negative  Rubella: immune   HbSAg: negative  HIV: negative  RPR: non reactive  Varicella: immune   GTT: has not had  GBS: unknown    Genetic screening: Low risk NIPT   US: Posterior placenta, normal anatomy x 2, male x 2  Growth:    (23w4d)  A 1lb5oz, 38%  B 1lb1oz, 3%  : (25w6d)  A: Breech  B: Vertex, reversed EDF       A/P: Vicky Garza is a 27 y.o.   @ 25w6d  Mo-Di TIUP  Selective IUGR Fetus B with Reverse EDF  Breech/Vertex presentation  Maternal Asthma (mild, intermittent)   -  *Admit to L&D  *IUGR/Reversed EDF  -Plan continuous monitoring  -Repeat dopplers tomorrow by [facility]  -BPP q 2 days or as indicated to be completed by HRPC  -Start Magnesium Sulfate for neuroprotection x 24 hours  -Rescue ANCS (initially given  and )   -Repeat NICU consult   *Delivery  -Likely via csxn as indicated for fetal distress  -Csxn consent reviewed  -Discussed potential indication for classical csxn  -Consents to transfusion if needed   -DECLINES sterilization  -Discussed indications for GETA versus spinal anesthesia  *Global  -Diet: regular  -Access: maintain HLIV and up to date COD  -DVT prevention: SCDs  -Has not had GTT testing       Bailey Ortiz M.D.,  2023, 2:08 PM

## 2023-07-06 NOTE — PROGRESS NOTES
Med rec completed per patient at bedside.  Allergies reviewed with patient. NKDA.  Patient's preferred pharmacy: [facility] in [place].     Patient denies using any prescription medications at home.  No outpatient antibiotics within the last 30 days.

## 2023-07-06 NOTE — PROGRESS NOTES
1244 26 y/o  EDC 10/13/23, EGA 25.6, sent over from [facility] and now here to L&D for continuous fetal monitoring of mono-di twins. EFM/TOCO applied, pt states positive FM. Denies vaginal LOF or bleeding.       1315 Dr. Ortiz updated, new orders received     1335 Dr. Ortiz to bedside, all patient questions answered at this time, new orders received

## 2023-07-07 PROCEDURE — 302790 HCHG STAT ANTEPARTUM CARE, DAILY

## 2023-07-07 PROCEDURE — 700111 HCHG RX REV CODE 636 W/ 250 OVERRIDE (IP): Performed by: OBSTETRICS & GYNECOLOGY

## 2023-07-07 PROCEDURE — 770002 HCHG ROOM/CARE - OB PRIVATE (112)

## 2023-07-07 RX ADMIN — BETAMETHASONE SODIUM PHOSPHATE AND BETAMETHASONE ACETATE 12 MG: 3; 3 INJECTION, SUSPENSION INTRA-ARTICULAR; INTRALESIONAL; INTRAMUSCULAR at 15:21

## 2023-07-07 ASSESSMENT — PAIN DESCRIPTION - PAIN TYPE: TYPE: OTHER (COMMENT)

## 2023-07-07 NOTE — CONSULTS
Asked to see this mother at 25 6/7 wks with mono-di twins and selective growth restriction of twin B. On Magnesium, has received a betamethasone course and a rescue course has been started. Today's EFW twin A 883g, twin B 666g.      Discussed:  Admission to NICU if infant is born under 35 wks.   Premature infants are at risk for pulmonary immaturity and may require oxygen supplementation via cannula, CPAP or potentially mechanical ventilation. May require surfactant administration.   Will likely need IV fluids via PIV or central line. Feeds will be introduced slowly, and oral feeds will begin around 34 wks. Infant will be started on antibiotics and monitored for infection.   Criteria for discharge is open crib, taking all feeds PO and no longer having apneas/bradycardic episodes. Most infants are discharged within 2-3 wks of due date.      All questions answered.      Thank you for the consult.

## 2023-07-07 NOTE — PROGRESS NOTES
Called to BS to locate FHT on B    Bedside US: FHT present 140s x2  Breech/Breech with both fetal head in RUQ.   Fetus' stomach to stomach with heart tones located suprapubic x 2    Cardiac activity observed by myself, patient, and RNs DELPHINE Awan and BRYANNA Kay.   Monitors placed based on US findings.     Bailey Ortiz MD

## 2023-07-07 NOTE — CARE PLAN
The patient is Watcher - Medium risk of patient condition declining or worsening    Shift Goals  Clinical Goals: Fetal monitoring  Patient Goals: Healthy babies  Family Goals: Support      Problem: Psychosocial - L&D  Goal: Spiritual and cultural needs incorporated into hospitalization  Outcome: Progressing  Flowsheets (Taken 7/7/2023 0037)  Incorporate Spiritual/Cultural Needs: Encouraged verbilization of feelings, concerns, expectations and needs     Problem: Cardiac Output  Goal: Patient will remain normotensive throughout hospitalization  Outcome: Progressing     Problem: Pain  Goal: Patient's pain will be alleviated or reduced to the patient’s comfort goal  Outcome: Progressing     Problem: Risk for Infection and Impaired Wound Healing  Goal: Patient will remain free from infection  Outcome: Progressing     Problem: Risk for Injury  Goal: Patient and fetus will be free of preventable injury/complications  Outcome: Progressing     Problem: Risk for Venous Thromboembolism (VTE)  Goal: VTE prevention measures will be implemented and patient will remain free from VTE  Outcome: Progressing

## 2023-07-07 NOTE — PROGRESS NOTES
0700: Report received from Neena DALLAS. POC discussed.     Dr Ko at bedside, monitoring orders received. Overnight tracing reviewed.     Pt reports good FM x2, denies pain or contractions.     0918: EFM/Ovid applied for continuous monitoring.     1900: Report to Elidia DALLAS. POC discussed.

## 2023-07-07 NOTE — PROGRESS NOTES
OB Progress Note:  26w0d  Date of Admission: 2023      Subjective:   Pt reports doing well, no concerns.  Denies LOF/VB/ctx.  Reports +FM.    Objective:   24hr VS:  Temp:  [36.2 °C (97.1 °F)-36.6 °C (97.8 °F)] 36.2 °C (97.1 °F)  Pulse:  [] 77  Resp:  [15-16] 16  BP: ()/(55-73) 97/61  SpO2:  [94 %-95 %] 95 %    Gen: AAO, NAD  Abdomen: gravid, soft, NT  Ext: No edema      NST:  Indication: mono-di TIUP, fetal growth restriction twin B  140/moderate variability/+ accelerations/no decelerations  140/moderate variability/+ accelerations/no decelerations  [place]: no ctx      Meds:     Current Facility-Administered Medications:     lactated ringers infusion, , Intravenous, Continuous, Bailey Ortiz M.D., Stopped at 23 2130    betamethasone acetate-betamethasone sodium phosphate ([place]) injection 12 mg, 12 mg, Intramuscular, Q24HR, Bailey Ortiz M.D., 12 mg at 23 1533      A/P:  27 y.o.  @ 26w0d with mo-di TIUP, selective growth restriction of twin B with reversed end diastolic flow  - rescue BMZ initiated, 2nd dose today.  S/p initial mag sulfate for fetal neuroprotection, restart if with signs of likely delivery.  Appreciate MFM involvement.   - pt aware of possible need for emergent delivery, s/p henrique consult.  - active COD in lab; anticipate would need classical c/s.  - will keep on continuous for now    - VTE ppx: SCDs    Continued inpatient monitoring    Gena Staley MD, FACOG  [facility]

## 2023-07-07 NOTE — PROGRESS NOTES
1900: Report received from Ashli DALLAS. POC discussed, assumed care of pt.    2100: Dr Jackson called and updated on pt status and FHT. Orders received to d/c mag and too give pt a 2 hour break after 4 hours of monitoring.    2105: Mag infusion stopped per orders.    0227: 2nd RN called to bedside to assist with locating babies on monitor.    0240: Charge RN called to bedside to assist with monitoring.    0300: Dr. Ortiz called to bedside by charge RN to locate FHT on baby B.     0306:  Working at bedside performing US. Cardiac acivity x2 observed. Baby B lying on top of Baby A. EFM monitors repositioned with US assistance.    0700: Report given to Silvia DALLAS. Care relinquished.

## 2023-07-07 NOTE — CARE PLAN
Problem: Knowledge Deficit - L&D  Goal: Patient and family/caregivers will demonstrate understanding of plan of care, disease process/condition, diagnostic tests and medications  Outcome: Progressing  Note: Plan of care reviewed including provider roles, health history, IV and medications, labs and tests. Patient assured that they may ask questions at any time and should always let staff know if they are having difficulty breathing, pain or any discomfort at any time. Patient oriented to room including call light. Magnesium, fetal monitoring frequency, betamethasone, and C/S discussed with patient and provider, all patient questions answered at this time     Problem: Risk for Injury  Goal: Patient and fetus will be free of preventable injury/complications  Outcome: Progressing  Flowsheets (Taken 7/6/2023 1400)  Mode: External [place]  Contraction Frequency: 4-6  Note: Continuous maternal and fetal monitoring in place   The patient is Watcher - Medium risk of patient condition declining or worsening    Shift Goals  Clinical Goals: Fetal monitoring  Patient Goals: Healthy babies  Family Goals: Emotional/Educational support    Progress made toward(s) clinical / shift goals:  Progressing at this time    Patient is not progressing towards the following goals: N/A

## 2023-07-07 NOTE — CONSULTS
MATERNAL FETAL MEDICINE CONSULTATION    Vicky Downing is admitted as a 27-year-old  3 para 2 () currently at 26 0/7 weeks based upon a due date of 2023.      Interval note: The patient feels well without contractions, bleeding, loss of fluid, or decreased fetal movement.  The monitoring has been reassuring without decelerations.  The  patient currently feels well.  We discussed over approximately 30 minutes the program.  We will perform biophysical profile with umbilical artery Doppler tomorrow.  Patient will remain inpatient at this time.  She will complete her steroid course.  We will discontinue magnesium for neuro protection.  NICU consult completed.  Delivery with spontaneous decelerations or worsening of biophysical profile.      Both babies is reviewed.  There is moderate variability.  Baseline approximately 155.  Accelerations are noted.  No decelerations are seen.  Rare contractions    HPI: The patient was sent over from the office with monochorionic diamniotic twin gestation and selective fetal growth restriction (2%, 666 grams).  On 23 there was first time persistent reversed end-diastolic flow.   Both twins were moving well.  There is no evidence of twin to twin transfusion syndrome.  The amniotic fluid was equal between the twins and the bladders were equally visualized.      The family has been counseled throughout regarding the risk for the early severe growth restriction.  The presenting baby was at the 44th percentile (833 grams) and is in a breech to transverse position.  The upper baby was at less than 3rd percentile (666 gr) with symmetrically small growth.  Amniotic fluid volume was normal.     The estimated fetal weight of the presenting transverse fetus is 1 pounds 15 ounces, 883 g).  The upper transverse baby is 666 g (1 pound 7 ounces).  The placentas are posterior.  There is no placenta previa.     We discussed that the finding of reversed end-diastolic flow  predicts a minimal amount of time prior to possible fetal distress and need for urgent delivery.  We discussed that reverse end-diastolic flow increases the risk for fetal loss.  We discussed these the shared circulation and monochorionic twins makes fetal loss dangerous for both twins, including the healthier appearing twin.      Plan:     1.  NICU consultation.     2.  Betamethasone for fetal lung maturation.     3.  24 hours of magnesium sulfate for fetal neuro protection.     4.  Initially continuous monitoring looking for evidence of spontaneous decelerations.     5.  Mode of delivery would be  section.     The family's questions were answered in the office.  I will see the patient daily.  We will repeat the umbilical artery Doppler twice a week and as needed.       Medications: Low-dose aspirin, prenatal vitamin, folic acid, calcium.     Past  Medical history:     1 anxiety with depression     2 intermittent headaches     Patient denies diabetes, hypertension, asthma, thyroid disease, collagen vascular disease, or thrombosis.  .    Surgical history: Denies      Family history denies significant inherited disease or teratogen exposure.       Social history: Patient denies smoking, alcohol, or drug use.  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  Her 's name is Min.       Obstetrical history: EDC 10/13/2023.   3 para 2 ().     Pregnancy #1: 2020: 39 weeks, 7 pounds 8 ounces, spontaneous vaginal delivery without complication      pregnancy #2: : 39-week, 7 pounds 12 ounces, spontaneous vaginal delivery without complication.       Allergies no known drug allergies    PHYSICAL EXAM:   Vitals:    23 1930 23 2100 23 2200 23 0115   BP: 99/59 107/60 104/64 97/61   Pulse: (!) 104 92 83 77   Resp: 16   16   Temp: 36.2 °C (97.2 °F)   36.2 °C (97.1 °F)   TempSrc: Temporal   Temporal   SpO2:    95%   Weight:       Height:             LABS:  Recent Labs      07/06/23  1330   WBC 11.6*   RBC 4.09*   HEMOGLOBIN 12.3   HEMATOCRIT 36.4*   MCV 89.0   MCH 30.1   MCHC 33.8   RDW 40.3   PLATELETCT 208   MPV 10.3      Recent Labs     07/06/23  1330   SODIUM 138   POTASSIUM 3.8   CHLORIDE 104   CO2 19*   GLUCOSE 79   BUN 12   CREATININE 0.36*   CALCIUM 9.2         Giovani Ko M.D.

## 2023-07-08 PROCEDURE — 302790 HCHG STAT ANTEPARTUM CARE, DAILY

## 2023-07-08 PROCEDURE — 4A1HXCZ MONITORING OF PRODUCTS OF CONCEPTION, CARDIAC RATE, EXTERNAL APPROACH: ICD-10-PCS | Performed by: OBSTETRICS & GYNECOLOGY

## 2023-07-08 PROCEDURE — 770002 HCHG ROOM/CARE - OB PRIVATE (112)

## 2023-07-08 ASSESSMENT — PAIN DESCRIPTION - PAIN TYPE: TYPE: OTHER (COMMENT)

## 2023-07-08 ASSESSMENT — LIFESTYLE VARIABLES: EVER_SMOKED: NEVER

## 2023-07-08 NOTE — CARE PLAN
The patient is Stable - Low risk of patient condition declining or worsening      Shift Goals  Clinical Goals: Fetal monitoring  Patient Goals: Healthy babies  Family Goals: Support  met  Progress made toward(s) clinical / shift goals:  met    Patient is not progressing towards the following goals:n/a

## 2023-07-08 NOTE — PROGRESS NOTES
1900-Rcvd report from parmjit RN and assumed care of pt.  1915-Pt would like to shower but wants to hear babies since they aren't moving as much as usual after eating dinner. 2 RN's unable to find baby B. Dr. Medley in department and asked to US babies.  Dr. Medley at bedside and both babies look good.  Pt up to bathroom to shower.  2056-pt back from shower and placed back on FM/TOCO for continuous monitoring.  0630-pt had quiet noc. No issues.  0700-report given to dayshift RN

## 2023-07-08 NOTE — CARE PLAN
The patient is Stable - Low risk of patient condition declining or worsening    Shift Goals  Clinical Goals: Fetal monitoring  Patient Goals: Healthy babies  Family Goals: Support    Progress made toward(s) clinical / shift goals:    Problem: Knowledge Deficit - L&D  Goal: Patient and family/caregivers will demonstrate understanding of plan of care, disease process/condition, diagnostic tests and medications  Outcome: Progressing     Problem: Pain  Goal: Patient's pain will be alleviated or reduced to the patient’s comfort goal  Outcome: Progressing

## 2023-07-08 NOTE — PROGRESS NOTES
"Antepartum Note    Doing well today. Denies CTX, bleeding, LOF. Good FM. Eating and voiding without difficulty. Denies swelling LE.     /57   Pulse 81   Temp 36.2 °C (97.2 °F) (Temporal)   Resp 16   Ht 1.702 m (5' 7\")   Wt 71.7 kg (158 lb)   SpO2 95% Comment: portable pulse ox  BMI 24.75 kg/m²     General: laying in bed comfortably on the monitor  Abdomen: gravid, non tender  Extremities: no edema    Twin A: baseline 130's, moderate variability present, accelerations present, decelerations absent  Twin B: baseline 140's, moderate variability , accelerations present, decelerations absent  [place]: quiescent    Assessment:   mo/di twin IUP at 26w1d  Selective growth restriction twin B with reversed end diastolic flow    Plan:  [facility]  S/P BMTZ with subsequent rescue dose completed   Restart mag sulfate if concern for imminent delivery  S/P Dustin consult  COD q72 hrs     Becca Reyes M.D.    "

## 2023-07-08 NOTE — PROGRESS NOTES
EDC - 10/13/2023 EGA - 26  Mono/Di Twin    0700 - Report received from ROSIE Mcmullen RN. POC discussed, care assumed. EFM adjusted  0812 -  Full Assessment complete. VSS. No complaints of contractions, ROM, or vaginal bleeding at this time. Pt reports good FM x2. POC discussed with pt and family members, all questions answered.   0900 - Dr. Reyes at bedside. Update given-will continue with current management  915 - Dr. Ko at bedside. Update given, MD reviewed tracing. Bedside US performed-see MD notes for details.   1207 - VSS. Pt denies needs at this time.  1511 - pt off monitor for 1 hour  1612 - External monitor placed x 3. VSS  1652 - EFM adjusted  1900 - report given to Bri DALLAS. POC discussed

## 2023-07-09 PROCEDURE — 302790 HCHG STAT ANTEPARTUM CARE, DAILY

## 2023-07-09 PROCEDURE — 770002 HCHG ROOM/CARE - OB PRIVATE (112)

## 2023-07-09 RX ORDER — VITAMIN A ACETATE, BETA CAROTENE, ASCORBIC ACID, CHOLECALCIFEROL, .ALPHA.-TOCOPHEROL ACETATE, DL-, THIAMINE MONONITRATE, RIBOFLAVIN, NIACINAMIDE, PYRIDOXINE HYDROCHLORIDE, FOLIC ACID, CYANOCOBALAMIN, CALCIUM CARBONATE, FERROUS FUMARATE, ZINC OXIDE, CUPRIC OXIDE 3080; 12; 120; 400; 1; 1.84; 3; 20; 22; 920; 25; 200; 27; 10; 2 [IU]/1; UG/1; MG/1; [IU]/1; MG/1; MG/1; MG/1; MG/1; MG/1; [IU]/1; MG/1; MG/1; MG/1; MG/1; MG/1
1 TABLET, FILM COATED ORAL
Status: DISCONTINUED | OUTPATIENT
Start: 2023-07-09 | End: 2023-07-24

## 2023-07-09 RX ORDER — FOLIC ACID 1 MG/1
1 TABLET ORAL DAILY
Status: DISCONTINUED | OUTPATIENT
Start: 2023-07-10 | End: 2023-07-24

## 2023-07-09 RX ORDER — ASPIRIN 81 MG/1
81 TABLET, CHEWABLE ORAL DAILY
Status: DISCONTINUED | OUTPATIENT
Start: 2023-07-10 | End: 2023-07-24

## 2023-07-09 ASSESSMENT — PATIENT HEALTH QUESTIONNAIRE - PHQ9
SUM OF ALL RESPONSES TO PHQ9 QUESTIONS 1 AND 2: 0
SUM OF ALL RESPONSES TO PHQ9 QUESTIONS 1 AND 2: 0
2. FEELING DOWN, DEPRESSED, IRRITABLE, OR HOPELESS: NOT AT ALL
1. LITTLE INTEREST OR PLEASURE IN DOING THINGS: NOT AT ALL
1. LITTLE INTEREST OR PLEASURE IN DOING THINGS: NOT AT ALL
2. FEELING DOWN, DEPRESSED, IRRITABLE, OR HOPELESS: NOT AT ALL

## 2023-07-09 ASSESSMENT — PAIN DESCRIPTION - PAIN TYPE: TYPE: ACUTE PAIN

## 2023-07-09 NOTE — CONSULTS
MATERNAL FETAL MEDICINE CONSULTATION    Vicky Downing was admitted as a 27-year-old  3 para 2 () currently at 26 1/7 weeks based upon a due date of 2023.       Interval note: The patient feels well without contractions, bleeding, loss of fluid, or decreased fetal movement.  The monitoring has been reassuring without decelerations.  The  patient currently feels well.  BMZ completed. Magnesium for neuro protection x 12 hours (rebolus if delivery imminent).  NICU consult completed.  Delivery with spontaneous decelerations or worsening of biophysical profile.    FHT of both babies reviewed.  There is moderate variability.  Baseline approximately 150.  Ten beat accelerations are noted.  No decelerations are seen.  Rare contractions    BPP A (breech) and B (breech) both 8/8. Adequate fluid.     Fetus B Umbilical artery Doppler is absent (but not reversed) EDF in one vessel and elevated but forward EDF in the other artery. Improved over the previous reversed flow.    MCA PI 1.7    Ductus venosus w forward flow in A wave (normal appearing)     HPI: The patient was sent over from the office with monochorionic diamniotic twin gestation and selective fetal growth restriction (2%, 666 grams).  On 23 there was first time persistent reversed end-diastolic flow.   Both twins were moving well.  There is no evidence of twin to twin transfusion syndrome.  The amniotic fluid was equal between the twins and the bladders were equally visualized.      The family has been counseled throughout regarding the risk for the early severe growth restriction.  The presenting baby was at the 44th percentile (833 grams) and is in a breech to transverse position.  The upper baby was at less than 3rd percentile (666 gr) with symmetrically small growth.  Amniotic fluid volume was normal.     The estimated fetal weight of the presenting transverse fetus is 1 pounds 15 ounces, 883 g).  The upper transverse baby is 666 g (1  pound 7 ounces).  The placentas are posterior.  There is no placenta previa.     We discussed that the finding of reversed end-diastolic flow predicts a minimal amount of time prior to possible fetal distress and need for urgent delivery.  We discussed that reverse end-diastolic flow increases the risk for fetal loss.  We discussed these the shared circulation and monochorionic twins makes fetal loss dangerous for both twins, including the healthier appearing twin.     Medications: Low-dose aspirin, prenatal vitamin, folic acid, calcium.     Past  Medical history:     1 anxiety with depression     2 intermittent headaches     Patient denies diabetes, hypertension, asthma, thyroid disease, collagen vascular disease, or thrombosis.    Surgical history: Denies     Family history denies significant inherited disease or teratogen exposure.     Social history: Patient denies smoking, alcohol, or drug use.  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  Her 's name is Min.     Obstetrical history: EDC 10/13/2023.   3 para 2 (). Pregnancy #1: 2020: 39 weeks, 7 pounds 8 ounces,  without complication  Pregnancy #2: : 39-week, 7 pounds 12 ounces, spontaneous vaginal delivery without complication.     Allergies: no known drug allergies     Plan:     1.  NICU consultation completed.   2.  Betamethasone for fetal lung maturation completed 23   3.  12 hours of magnesium sulfate for fetal neuro protection completed. Rebolus if delivery imminent).    4.  Initially continuous monitoring looking for evidence of spontaneous decelerations. Monitor at least 3 x daily. Patient prefers continuous for now.  5.  Mode of delivery would be  section.     The family's questions were answered.  We will see the patient daily.  We will repeat the umbilical artery Doppler twice a week and as needed.       PHYSICAL EXAM:   Vitals:    23 0812 23 1207 23 1630 23   BP: 107/57 98/61  102/58 115/66   Pulse: 81 84 75 83   Resp: 16 17 18 17   Temp: 36.2 °C (97.2 °F) 36.2 °C (97.1 °F) 36.4 °C (97.6 °F) 36.6 °C (97.9 °F)   TempSrc: Temporal Temporal Temporal Temporal   SpO2:       Weight:       Height:           LABS:  Recent Labs     07/06/23  1330   WBC 11.6*   RBC 4.09*   HEMOGLOBIN 12.3   HEMATOCRIT 36.4*   MCV 89.0   MCH 30.1   MCHC 33.8   RDW 40.3   PLATELETCT 208   MPV 10.3      Recent Labs     07/06/23  1330   SODIUM 138   POTASSIUM 3.8   CHLORIDE 104   CO2 19*   GLUCOSE 79   BUN 12   CREATININE 0.36*   CALCIUM 9.2         Giovani Ko M.D.

## 2023-07-09 NOTE — PROGRESS NOTES
Late entry    0700 Report received from CELENA Rodriguez. RN at bedside for introduction. Patient has no complaints of decreased fetal movement, LOF, bleeding, contractions. POC discussed, questions answered. Patient encouraged to use call light if assistance is needed.     0800 Dr. Ko at bedside for ultrasound. Orders received- see Nursing Communication.     0930 Patient off monitors for 2 hours.     1130 Patient EFM's and TOCO applied for q4 hr continuous monitoring.     1530 EFMs and TOCO removed for 2 hours. Patient requesting EFM and TOCO to be back on @ 1730 for 2 hours then a break to eat dinner and shower at 1930.     1730 EFMs and TOCO applied for monitoring.     1900 Report given to CELENA Chaudhary. Transfer of care at this time.

## 2023-07-09 NOTE — PROGRESS NOTES
1900- Report received from Dixie DALLAS. Care assumed. Pt resting comfortably in room, Support at her bedside. VSS and POC discussed,  Pt requesting to be off monitoring  shortly to eat and shower.     Multiple nurses to bedside throughout the night readjusting monitors to trace infants. US utilized for positioning.     0345- Pt states she is uncomfortable and want to go off monitoring to use restroom and get some rest. Charge RN reviewed strip per request, Pt off monitor at 0400.     0500- EFM/TOCO reapplied.     0550- Pt states she does not feel any ctx, Dr. Ko given report on pt including ctx and tracing, No new orders received a this time. Provider will be beside this morning to see pt.    0700- Report given to Tara DALLAS. Care transferred.

## 2023-07-09 NOTE — CONSULTS
MATERNAL FETAL MEDICINE CONSULTATION     Vicky Downing was admitted as a 27-year-old  3 para 2 () currently at 26 2/7 weeks based upon a due date of 2023.       Interval note: Nurses report monitored contractions. No bleeding, loss of fluid, or decreased fetal movement.  The monitoring has been without decelerations.      The  patient currently feels well.  BMZ completed 7-6 to 7-7. Magnesium for neuro protection x 12 hours (rebolus if delivery imminent).  NICU consult completed.  Delivery with spontaneous decelerations or worsening of biophysical profile.     FHT of both babies reviewed.  There is moderate variability.  Baseline approximate at 150-155 BPM .  Ten beat accelerations are noted.  No decelerations are seen.  Rare contractions    BPP: Fetus A (breech to the right) and fetus B (breech to the left) both 8/8. Adequate fluid. Breathing, movement, tone, fluid DVP > 2cm seen    Doppler Fetus B: Umbilical artery Doppler is absent (but not reversed) EDF in one vessel and elevated but forward EDF in the other artery.   MCA PI .98. Ductus venosus w forward flow in A wave (normal appearing)     HPI: The patient was sent over from the office on 23 with monochorionic diamniotic twin gestation and selective fetal growth restriction (2%, 666 grams).  On 23 there was first time persistent reversed end-diastolic flow. There has been no evidence of twin to twin transfusion syndrome.  The amniotic fluid was equal between the twins and the bladders were equally visualized.      The family has been counseled throughout regarding the risks from early severe growth restriction.  on 23 the presenting baby was at the 44th percentile (833 grams, 1# 15 oz) and is in a breech to transverse position.  The upper baby was at less than 3rd percentile (666 gr 1# 7oz) with symmetrically small growth.  Amniotic fluid volume was normal.     We have discussed that reversed end-diastolic flow predicts a  decreasing amount of time prior to fetal distress and the need for urgent delivery. We have discussed that reversed end-diastolic flow increases the risk for fetal loss.  We discussed that the shared circulation in monochorionic twins makes a fetal loss dangerous for both twins, including the healthier appearing presenting twin.      Medications: Low-dose aspirin, prenatal vitamin, folic acid, calcium.     Past  Medical history:  1) anxiety with depression  2) intermittent headaches. Patient denies diabetes, hypertension, asthma, thyroid disease, collagen vascular disease, or thrombosis.     Surgical history: Denies     Family history: denies significant inherited disease or teratogen exposure.     Social history: Patient denies smoking, alcohol, or drug use.  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  Her 's name is Min.     Obstetrical history:  3 para 2 (). Pregnancy #1: 2020: 39 weeks, 7 pounds 8 ounces,  without complication  Pregnancy #2: : 39-week, 7 pounds 12 ounces, spontaneous vaginal delivery without complication.     Allergies: no known drug allergies     Plan:     1.  NICU consultation completed.   2.  Betamethasone for fetal lung maturation completed 23   3.  12 hours of magnesium sulfate for fetal neuro protection completed. Rebolus if delivery imminent).    4.  Initially continuous monitoring looking for evidence of spontaneous decelerations. Monitor at least 3 x daily for 1-2 hours. BPP with Doppler q 3 days and prn. Patient prefers continuous monitoring with prn breaks for now.  5.  Mode of delivery would be  section.     The family's questions were answered.  We will see the patient daily.  We will repeat the umbilical artery Doppler twice a week and as needed.       PHYSICAL EXAM:   Vitals:    23 1630 23 0000 23 0400   BP: 102/58 115/66 98/56 100/61   Pulse: 75 83 76 73   Resp: 18 17 15 15   Temp: 36.4 °C (97.6 °F) 36.6  °C (97.9 °F) 36.1 °C (97 °F) 36.5 °C (97.7 °F)   TempSrc: Temporal Temporal Temporal Temporal   SpO2:       Weight:       Height:             LABS:  Recent Labs     07/06/23  1330   WBC 11.6*   RBC 4.09*   HEMOGLOBIN 12.3   HEMATOCRIT 36.4*   MCV 89.0   MCH 30.1   MCHC 33.8   RDW 40.3   PLATELETCT 208   MPV 10.3      Recent Labs     07/06/23  1330   SODIUM 138   POTASSIUM 3.8   CHLORIDE 104   CO2 19*   GLUCOSE 79   BUN 12   CREATININE 0.36*   CALCIUM 9.2         Giovani Ko M.D.

## 2023-07-09 NOTE — CARE PLAN
The patient is Stable - Low risk of patient condition declining or worsening    Shift Goals  Clinical Goals: Fetal monitoring  Patient Goals: Healthy baby  Family Goals: support    Progress made toward(s) clinical / shift goals:    Problem: Psychosocial - L&D  Goal: Patient's level of anxiety will decrease  7/9/2023 0437 by Bri Monzon, R.N.  Outcome: Progressing  Flowsheets (Taken 7/9/2023 0437)  Decrease Anxiety Level:   Collaborated with patient to identify and develop coping strategies   Implemented stimuli reduction, calming techniques   Encouraged support system participation  7/9/2023 0437 by Bri Monzon, R.N.  Outcome: Progressing  Goal: Patient will be able to discuss coping skills during hospitalization  7/9/2023 0437 by Bri Monzon, R.N.  Outcome: Progressing  7/9/2023 0437 by Bri Monzon, R.N.  Outcome: Progressing       Patient is not progressing towards the following goals:

## 2023-07-10 LAB
ABO GROUP BLD: NORMAL
BLD GP AB SCN SERPL QL: NORMAL
RH BLD: NORMAL

## 2023-07-10 PROCEDURE — 36415 COLL VENOUS BLD VENIPUNCTURE: CPT

## 2023-07-10 PROCEDURE — 700102 HCHG RX REV CODE 250 W/ 637 OVERRIDE(OP): Performed by: OBSTETRICS & GYNECOLOGY

## 2023-07-10 PROCEDURE — 86850 RBC ANTIBODY SCREEN: CPT

## 2023-07-10 PROCEDURE — 86901 BLOOD TYPING SEROLOGIC RH(D): CPT

## 2023-07-10 PROCEDURE — 302790 HCHG STAT ANTEPARTUM CARE, DAILY

## 2023-07-10 PROCEDURE — 4A0HXCZ MEASUREMENT OF PRODUCTS OF CONCEPTION, CARDIAC RATE, EXTERNAL APPROACH: ICD-10-PCS | Performed by: OBSTETRICS & GYNECOLOGY

## 2023-07-10 PROCEDURE — 86900 BLOOD TYPING SEROLOGIC ABO: CPT

## 2023-07-10 PROCEDURE — 770002 HCHG ROOM/CARE - OB PRIVATE (112)

## 2023-07-10 PROCEDURE — A9270 NON-COVERED ITEM OR SERVICE: HCPCS | Performed by: OBSTETRICS & GYNECOLOGY

## 2023-07-10 PROCEDURE — 59025 FETAL NON-STRESS TEST: CPT

## 2023-07-10 RX ADMIN — VITAMIN A, VITAMIN C, VITAMIN D, VITAMIN E, THIAMINE, RIBOFLAVIN, NIACIN, VITAMIN B6, FOLIC ACID, VITAMIN B12, CALCIUM, IRON, ZINC, COPPER 1 TABLET: 4000; 120; 400; 22; 1.84; 3; 20; 10; 1; 12; 200; 27; 25; 2 TABLET ORAL at 08:37

## 2023-07-10 RX ADMIN — FOLIC ACID 1 MG: 1 TABLET ORAL at 08:38

## 2023-07-10 RX ADMIN — ASPIRIN 81 MG 81 MG: 81 TABLET ORAL at 08:38

## 2023-07-10 NOTE — PROGRESS NOTES
PROGRESS NOTE    DATE: 23  Hospital day:   Gestational Age: 26w2d, Estimated Date of Delivery: 10/13/23  Primary OB/GYN: Daysi Ríos MD  [facility] Consultant: [facility]    MAGDY  Vicky Garza is a 27 y.o.  female with Estimated Date of Delivery: 10/13/23 at 26w2d  gestation who was admitted due to selective fetal growth restriction of twin B with abnormal umbilical artery Dopplers.    The patient reports that overall she is doing well.  She reports that she does feel like she is strapped to her diet bed and reports that she feels like a prisoner.  She reports that she is doing a lot better today now that she has settled in and the umbilical artery Dopplers were slightly improved today.  She endorses positive fetal movement of both babies.  She denies vaginal bleeding or loss of fluid.  Denies contractions.  She has no concerns at this time.  She is asking if she can go outside tomorrow.    OBJECTIVE:  PHYSICAL EXAM:  Vitals:    23 1623   BP: 99/55   Pulse: 73   Resp: 16   Temp: 36.3 °C (97.3 °F)   SpO2:    General:   Alert, conversational, pleasant, no acute distress  Lungs:   No respiratory distress, lungs clear to auscultation bilaterally   Heart:   Regular rate and rhythm   Abdomen:  Soft, gravid, non-tender, non-distended  Genitourinary: Deferred  Extremities: Moves all, no edema    OB Ultrasound:  per [facility] Dr. Ko: BPP: Fetus A (breech to the right) and fetus B (breech to the left) both 8/8. Adequate fluid. Breathing, movement, tone, fluid DVP > 2cm seen.     Doppler Fetus B: Umbilical artery Doppler is absent (but not reversed) EDF in one vessel and elevated but forward EDF in the other artery.   MCA PI .98. Ductus venosus w forward flow in A wave (normal appearing)     FHT: FHT of both babies reviewed.  Moderate variability, baselines approximate at 150-155 BPM, ten beat accelerations, no decelerations, rare contractions.    Medications:   Current  Facility-Administered Medications   Medication Dose Route Frequency Provider Last Rate Last Admin    lactated ringers infusion   Intravenous Continuous Bailey Ortiz M.D.   Stopped at 23      TREATMENT & PROPHYLAXIS  Steroids: Rescue course of betamethasone completed on  and .  Magnesium: 12 hours of treatment was given on  for fetal neuro protection.  Antibiotics: Not indicated.    VTE Prophylaxis: Ambulatory.     ASSESSMENT & PLAN  Vicky Garza is a 27 y.o.  at 26w2d with monochorionic diamniotic twin gestation (EDC 10/13/2023)    ASSESSMENT  -  intrauterine pregnancy at 26+2 weeks with monochorionic diamniotic twin gestation.  - Selective fetal growth restriction of twin B with abnormal umbilical artery Dopplers (currently absent end-diastolic flow in 1 artery and forward flow with elevated Dopplers and the other artery).  -Fetal breech malpresentation.    PLAN:  Continue inpatient management of selective fetal growth restriction of twin B with abnormal umbilical artery Dopplers.  1 hour NST every 8 hours (confirmed with Dr. Cochran that this monitoring recall is acceptable and the patient desires relief from the monitors).   BPP with umbilical artery Dopplers every 3 days per [facility].  COD every 72 hours.  Patient will need a 1 hour glucose tolerance test 7 days after her most recent steroid course if she remains inpatient.    Plan of care discussed with the patient and she is in agreement with this plan. Plan of care discussed with RN taking carer of patient. All questions and concerns were answered.     Trinidad Licona M.D.

## 2023-07-10 NOTE — PROGRESS NOTES
PROGRESS NOTE    DATE: 07/10/23  Hospital day:   Gestational Age: 26w3d, Estimated Date of Delivery: 10/13/23  Primary OB/GYN: Daysi Ríos MD  [facility] Consultant: [facility]    MAGDY  Vicky Garza is a 27 y.o.  female with Estimated Date of Delivery: 10/13/23 at 26w3d gestation who was admitted due to selective fetal growth restriction of twin B with abnormal umbilical artery Dopplers.    Pt feeling well today.  Noting excellent fetal movement for both babies.  She denies leaking or bleeding.  She denies contractions.  She has no other specific complaints    OBJECTIVE:  PHYSICAL EXAM:  Vitals:    07/10/23 1241   BP: 109/71   Pulse: 93   Resp: 16   Temp: 36.3 °C (97.4 °F)   SpO2: 97%   General:     Alert, conversational, pleasant, no acute distress  Lungs:    No respiratory distress,  CV:     Regular rate and rhythm   Abdomen:   Soft, gravid, non-tender, non-distended  Genitourinary:  Deferred  Extremities:  Moves all, no edema    OB Ultrasound:  per M Dr. Ko: BPP: Fetus A (breech to the right) and fetus B (breech to the left) both 8/8. Adequate fluid. Breathing, movement, tone, fluid DVP > 2cm seen.     Doppler Fetus B: Umbilical artery Doppler is absent (but not reversed) EDF in one vessel and elevated but forward EDF in the other artery.   MCA PI .98. Ductus venosus w forward flow in A wave (normal appearing)     FHT: Reactive x2, see separate NST note.    Medications:   Current Facility-Administered Medications   Medication Dose Route Frequency Provider Last Rate Last Admin    aspirin (Asa) chewable tab 81 mg  81 mg Oral DAILY Trinidad Licona M.D.   81 mg at 07/10/23 0838    prenatal plus vitamin (Stuartnatal 1+1) 27-1 MG tablet 1 Tablet  1 Tablet Oral Daily-0800 Trinidad Licona M.D.   1 Tablet at 07/10/23 0837    folic acid (Folvite) tablet 1 mg  1 mg Oral DAILY Trinidad Licona M.D.   1 mg at 07/10/23 0838    lactated ringers infusion   Intravenous Continuous Bailey Ortiz  M.D.   Stopped at 23      TREATMENT & PROPHYLAXIS  Steroids: Rescue course of betamethasone completed on  and .  Magnesium: 12 hours of treatment was given on  for fetal neuro protection, since d/c'd.  Antibiotics: Not indicated.    VTE Prophylaxis: Ambulatory.     ASSESSMENT & PLAN  Vicky Garza is a 27 y.o.  at 26w3d with monochorionic diamniotic twin gestation (EDC 10/13/2023)    ASSESSMENT  -  intrauterine pregnancy at 26w3d with monochorionic diamniotic twin gestation.  - Selective fetal growth restriction of twin B with abnormal umbilical artery Dopplers (currently absent end-diastolic flow in 1 artery and forward flow with elevated Dopplers and the other artery).  -Fetal breech (Twin A) and transverse (Twin B) malpresentation.    PLAN:  Continue inpatient management of selective fetal growth restriction of twin B with abnormal umbilical artery Dopplers.  1 hour NST every 8 hours.   BPP with umbilical artery Dopplers twice weekly per [facility], last on Saturday.  COD every 72 hours.  Patient will need a 1 hour glucose tolerance test 7 days after her most recent steroid course if she remains inpatient.    Plan of care discussed with the patient and she is in agreement with this plan. Plan of care discussed with Dr. Cochran of Maternal Fetal Medicine today, RN taking carer of patient. All questions and concerns were answered.     Brando Smith MD, MS, FACOG, 7/10/2023, 2:36 PM

## 2023-07-10 NOTE — PROGRESS NOTES
[facility] NST    Time 0430    Indication: Mo/Di twins, Selective FGR with abnormal Dopplers  Findings: 130/135, moderate variability x 2, Reactive, Reassuring  Plan: 1 hour q 8 hours

## 2023-07-10 NOTE — CARE PLAN
The patient is Stable - Low risk of patient condition declining or worsening    Shift Goals  Clinical Goals: Fetal monitoring  Patient Goals: Healthy babies  Family Goals: Support      Problem: Knowledge Deficit - L&D  Goal: Patient and family/caregivers will demonstrate understanding of plan of care, disease process/condition, diagnostic tests and medications  Outcome: Progressing     Problem: Psychosocial - L&D  Goal: Patient's level of anxiety will decrease  Outcome: Progressing     Problem: Pain  Goal: Patient's pain will be alleviated or reduced to the patient’s comfort goal  Outcome: Progressing     Problem: Risk for Injury  Goal: Patient and fetus will be free of preventable injury/complications  Outcome: Progressing

## 2023-07-10 NOTE — PROGRESS NOTES
1900: Report received from Tara DALLAS. POC discussed, assumed care of pt.  1915: Dr Licona  at bedside discussing POC with patient. Orders received for 1 hour NST Q8 hrs.  2000: Assessment complete. VSS. Pt denies any ctxs/VB/LOF. Reports good FM x2.   0700: Report given to Keyana DALLAS.

## 2023-07-10 NOTE — PROGRESS NOTES
MATERNAL FETAL MEDICINE PROGRESS NOTE    S: No complaints. Good fetal movement.    O:   Vitals:    07/10/23 0400   BP: 104/61   Pulse: (!) 104   Resp: 16   Temp: 36.1 °C (96.9 °F)   SpO2:        Exam  GEN: NAD  ABD : Gravid, NT, Size >> Dates  EXT: no edema    NST: reactive x 2, reassuring    Assessment and Plan    27-year-old  3 para 2 () currently at 26 3/7 weeks based upon a due date of 2023    Mo/Di twins  Selective FGR twin B with abnormal UA Doppler      Betamethasone completed 2023  S/P Mg prophylaxis  NICU consult completed  Monitoring 1 hour q 8 hours      Henrry Cochran M.D.  7/10/2023

## 2023-07-11 PROCEDURE — 59025 FETAL NON-STRESS TEST: CPT

## 2023-07-11 PROCEDURE — 700102 HCHG RX REV CODE 250 W/ 637 OVERRIDE(OP): Performed by: OBSTETRICS & GYNECOLOGY

## 2023-07-11 PROCEDURE — 302790 HCHG STAT ANTEPARTUM CARE, DAILY

## 2023-07-11 PROCEDURE — A9270 NON-COVERED ITEM OR SERVICE: HCPCS | Performed by: OBSTETRICS & GYNECOLOGY

## 2023-07-11 PROCEDURE — 770002 HCHG ROOM/CARE - OB PRIVATE (112)

## 2023-07-11 RX ADMIN — FOLIC ACID 1 MG: 1 TABLET ORAL at 07:49

## 2023-07-11 RX ADMIN — VITAMIN A, VITAMIN C, VITAMIN D, VITAMIN E, THIAMINE, RIBOFLAVIN, NIACIN, VITAMIN B6, FOLIC ACID, VITAMIN B12, CALCIUM, IRON, ZINC, COPPER 1 TABLET: 4000; 120; 400; 22; 1.84; 3; 20; 10; 1; 12; 200; 27; 25; 2 TABLET ORAL at 07:49

## 2023-07-11 RX ADMIN — ASPIRIN 81 MG 81 MG: 81 TABLET ORAL at 07:49

## 2023-07-11 NOTE — PROGRESS NOTES
MFM NST       Indication: Mo/Di twins, Selective FGR with abnormal Dopplers  Findings: 135/140, moderate variability x 2, Reactive, No decels, Reassuring  Plan: 1 hour q 8 hours

## 2023-07-11 NOTE — PROGRESS NOTES
1900: Received report from Keyana DALLAS, plan of care discussed.     2254: Dr Heidy Smith was called regarding Fetal monitoring strip, requesting MD to review. Baby A is broken and is continuously moving, unable to get unbroken tracing. MD ok to take monitors off until next scheduled monitoring.     0700: Report given to Ashli DALLAS, plan of care discussed.

## 2023-07-11 NOTE — PROGRESS NOTES
MATERNAL FETAL MEDICINE PROGRESS NOTE    S: No complaints. Good fetal movement.    O:   Vitals:    23 0753   BP: 116/77   Pulse: 96   Resp: 16   Temp: 36.5 °C (97.7 °F)   SpO2:      Exam  GEN: NAD  ABD : Gravid, NT, Size >> Dates  EXT: no edema     NST: reactive x 2, reassur       Assessment and Plan     27-year-old  3 para 2 () currently at 26 4/7 weeks based upon a due date of 2023     Mo/Di twins  Selective FGR twin B with abnormal UA Doppler      Betamethasone completed 2023  S/P Mg prophylaxis  NICU consult completed  Monitoring 1 hour q 8 hours  Repeat UA Doppler twice weekly      Henrry Cochran M.D.  2023

## 2023-07-11 NOTE — CARE PLAN
Problem: Knowledge Deficit - L&D  Goal: Patient and family/caregivers will demonstrate understanding of plan of care, disease process/condition, diagnostic tests and medications  Outcome: Progressing  Note: No change to plan of care, all patient questions answered at this time, patient desires to tour NICU today     Problem: Risk for Injury  Goal: Patient and fetus will be free of preventable injury/complications  Outcome: Progressing  Flowsheets  Taken 7/10/2023 2257 by Sandy Marino, R.N.  Mode: External [place]  Contraction Frequency: X1/ irrit  Taken 7/9/2023 0800 by Tara Dailey [email or web address]  Resting Tone Palpated: Soft  Note: Fetal monitoring complete and reactive, next monitoring to be completed at 1500   The patient is Stable - Low risk of patient condition declining or worsening    Shift Goals  Clinical Goals: Fetal monitoring  Patient Goals: Healthy babies  Family Goals: Support    Progress made toward(s) clinical / shift goals:  Progressing    Patient is not progressing towards the following goals: N/A       Pt given food, and juice. Stating his head feels better. Mom at [facility].

## 2023-07-11 NOTE — PROGRESS NOTES
0700 Report received from Sandy ROGEL    0746 Fetal monitoring complete and reactive    1530 Patient off unit to [facility] NICU with this RN    1624 Fetal monitoring complete and reactive

## 2023-07-12 PROCEDURE — 700102 HCHG RX REV CODE 250 W/ 637 OVERRIDE(OP): Performed by: OBSTETRICS & GYNECOLOGY

## 2023-07-12 PROCEDURE — A9270 NON-COVERED ITEM OR SERVICE: HCPCS | Performed by: OBSTETRICS & GYNECOLOGY

## 2023-07-12 PROCEDURE — 59025 FETAL NON-STRESS TEST: CPT

## 2023-07-12 PROCEDURE — 770002 HCHG ROOM/CARE - OB PRIVATE (112)

## 2023-07-12 RX ADMIN — ASPIRIN 81 MG 81 MG: 81 TABLET ORAL at 12:18

## 2023-07-12 RX ADMIN — VITAMIN A, VITAMIN C, VITAMIN D, VITAMIN E, THIAMINE, RIBOFLAVIN, NIACIN, VITAMIN B6, FOLIC ACID, VITAMIN B12, CALCIUM, IRON, ZINC, COPPER 1 TABLET: 4000; 120; 400; 22; 1.84; 3; 20; 10; 1; 12; 200; 27; 25; 2 TABLET ORAL at 12:19

## 2023-07-12 RX ADMIN — FOLIC ACID 1 MG: 1 TABLET ORAL at 12:19

## 2023-07-12 ASSESSMENT — PATIENT HEALTH QUESTIONNAIRE - PHQ9
SUM OF ALL RESPONSES TO PHQ9 QUESTIONS 1 AND 2: 0
2. FEELING DOWN, DEPRESSED, IRRITABLE, OR HOPELESS: NOT AT ALL
1. LITTLE INTEREST OR PLEASURE IN DOING THINGS: NOT AT ALL

## 2023-07-12 NOTE — PROGRESS NOTES
Progress Note    Subjective: Patient went to [facility], not in room    Objective Data:            Vitals:    23 2000 23 2200 23 0500 23 0848   BP: 116/63 106/62 102/58 110/62   Pulse: (!) 107 88 80 85   Resp:  17 14 15   Temp: 36.4 °C (97.6 °F) 36.4 °C (97.6 °F) 36.2 °C (97.1 °F) 36.4 °C (97.6 °F)   TempSrc: Temporal Temporal Temporal Temporal   SpO2: 95%   96%   Weight:       Height:             Current Facility-Administered Medications   Medication Dose Route Frequency Provider Last Rate Last Admin    aspirin (Asa) chewable tab 81 mg  81 mg Oral DAILY Trinidad Licona M.D.   81 mg at 23 0749    prenatal plus vitamin (Stuartnatal 1+1) 27-1 MG tablet 1 Tablet  1 Tablet Oral Daily-0800 Trinidad Licona M.D.   1 Tablet at 23 0749    folic acid (Folvite) tablet 1 mg  1 mg Oral DAILY Trinidad Licona M.D.   1 mg at 23 0749    lactated ringers infusion   Intravenous Continuous Bailey Ortiz M.D.   Stopped at 23 2130     Baby A fetal heart tones 140s with positive accelerations, no decelerations, appropriate for gestational age  Baby B fetal heart tones 140s positive accelerations, no decelerations, appropriate for gestational age      Assessment: Intrauterine gestation twins at 26 weeks and 5 days  Selective fetal growth restriction in baby B with absent end-diastolic flow and 1 artery  Patient has already received betamethasone x2 as well as rescue dose steroids  Followed by maternal-fetal medicine with Dopplers twice weekly, NSTs 3 times daily    Plan: Continue inpatient management   delivery for changes in fetal status  We will restart magnesium for neuro protection if delivery appears to be imminent

## 2023-07-12 NOTE — PROGRESS NOTES
MATERNAL FETAL MEDICINE PROGRESS NOTE    S: No complaints. Good fetal movement    O:   Vitals:    23 0500   BP: 102/58   Pulse: 80   Resp: 14   Temp: 36.2 °C (97.1 °F)   SpO2:      Exam  GEN: NAD  ABD : Gravid, NT, Size >> Dates  EXT: no edema     NST: reactive x 2, reassuring  BPP: 10/10 x 2     Assessment and Plan     27-year-old  3 para 2 () currently at 26 57 weeks based upon a due date of 2023     Mo/Di twins  Selective FGR twin B with abnormal UA Doppler - Twin A normal, Twin B with intermittent absent and reversed flow in 1 artery but normal to mildly elevated in other artery     Betamethasone completed 2023  S/P Mg prophylaxis  NICU consult completed  Monitoring 1 hour q 8 hours  Repeat UA Doppler twice weekly      Henrry Cochran M.D.  2023

## 2023-07-12 NOTE — PROGRESS NOTES
0700 - Report received from jung Melgar assumed.    0730 - Pt resting comfortably in bed, head to toe assessment complete. Pt reports positive fetal movement x2. Pt denies Ucs/LOF/VB.     0815 - Dr. Cochran at bedside for US.    1845 - Report given to CELENA Melgar, care transferred.

## 2023-07-12 NOTE — PROGRESS NOTES
MFM ultasound/BPP and UA Dopplers    Twin A - transverse with head on materna left.  UA Doppler S/D 5.0 with good end-diastolic flow. DVP 3.3 cm. NST reactive without decels. BPP 10/10    Twin B - transverse with head on maternal right.  UA Doppler 4.5 -8.2 in one artery and absent EDV  with intermittent reversal of flow in other artery.  BPP 10/10.

## 2023-07-12 NOTE — CARE PLAN
The patient is Stable - Low risk of patient condition declining or worsening    Shift Goals  Clinical Goals: fetal monitoring  Patient Goals: healthy babies  Family Goals: emotional support    Progress made toward(s) clinical / shift goals:    Problem: Knowledge Deficit - L&D  Goal: Patient and family/caregivers will demonstrate understanding of plan of care, disease process/condition, diagnostic tests and medications  Outcome: Progressing     Problem: Psychosocial - L&D  Goal: Patient's level of anxiety will decrease  Outcome: Progressing

## 2023-07-13 LAB
ABO GROUP BLD: NORMAL
BLD GP AB SCN SERPL QL: NORMAL
RH BLD: NORMAL

## 2023-07-13 PROCEDURE — 86901 BLOOD TYPING SEROLOGIC RH(D): CPT

## 2023-07-13 PROCEDURE — 86850 RBC ANTIBODY SCREEN: CPT

## 2023-07-13 PROCEDURE — A9270 NON-COVERED ITEM OR SERVICE: HCPCS | Performed by: OBSTETRICS & GYNECOLOGY

## 2023-07-13 PROCEDURE — 302790 HCHG STAT ANTEPARTUM CARE, DAILY

## 2023-07-13 PROCEDURE — 86900 BLOOD TYPING SEROLOGIC ABO: CPT

## 2023-07-13 PROCEDURE — 36415 COLL VENOUS BLD VENIPUNCTURE: CPT

## 2023-07-13 PROCEDURE — 770002 HCHG ROOM/CARE - OB PRIVATE (112)

## 2023-07-13 PROCEDURE — 700102 HCHG RX REV CODE 250 W/ 637 OVERRIDE(OP): Performed by: OBSTETRICS & GYNECOLOGY

## 2023-07-13 RX ADMIN — VITAMIN A, VITAMIN C, VITAMIN D, VITAMIN E, THIAMINE, RIBOFLAVIN, NIACIN, VITAMIN B6, FOLIC ACID, VITAMIN B12, CALCIUM, IRON, ZINC, COPPER 1 TABLET: 4000; 120; 400; 22; 1.84; 3; 20; 10; 1; 12; 200; 27; 25; 2 TABLET ORAL at 07:42

## 2023-07-13 RX ADMIN — ASPIRIN 81 MG 81 MG: 81 TABLET ORAL at 07:42

## 2023-07-13 RX ADMIN — FOLIC ACID 1 MG: 1 TABLET ORAL at 07:43

## 2023-07-13 NOTE — CARE PLAN
The patient is Stable - Low risk of patient condition declining or worsening    Shift Goals  Clinical Goals: fetal monitoring  Patient Goals: healthy babies  Family Goals: emotional support    Progress made toward(s) clinical / shift goals: Healthy mom, healthy babies    Patient is not progressing towards the following goals:

## 2023-07-13 NOTE — PROGRESS NOTES
1900: Received report from Ector DALLAS. POC discussed. Educated pt on POC for the night.     2310: NST completed    0500: Pt sleeping quietly at this time. No cmplaints.

## 2023-07-13 NOTE — CARE PLAN
Problem: Knowledge Deficit - L&D  Goal: Patient and family/caregivers will demonstrate understanding of plan of care, disease process/condition, diagnostic tests and medications  Outcome: Progressing     Problem: Psychosocial - L&D  Goal: Patient's level of anxiety will decrease  Outcome: Progressing     Problem: Risk for Venous Thromboembolism (VTE)  Goal: VTE prevention measures will be implemented and patient will remain free from VTE  Outcome: Progressing   The patient is Stable - Low risk of patient condition declining or worsening    Shift Goals  Clinical Goals: fetal monitoring  Patient Goals: healthy babies  Family Goals: emotional support

## 2023-07-13 NOTE — PROGRESS NOTES
0700. Report from Talia DALLAS. POC discussed and resumed.    0745. At the bedside. EFM and TOCO placed. Pt denies uc',s. Leaking or bleeding and notes +FM. Pt has no needs at this time.

## 2023-07-13 NOTE — PROGRESS NOTES
MFM NST    Indication: Di/mo twins, FGR twin B  Findings: 130/140, reactive x 2, moderate variability, no decels, reassuring  Plan: 1 hour q 8 hours    Note: NST is from 7/12 2200, read today.Pt is on monitor now.

## 2023-07-13 NOTE — PROGRESS NOTES
MATERNAL FETAL MEDICINE PROGRESS NOTE    S: No complaints. Good fetal movement    O:   Vitals:    23 0500   BP: 100/59   Pulse: 85   Resp: 14   Temp: 36.3 °C (97.4 °F)   SpO2: 95%        Exam  GEN: NAD  ABD : Gravid, NT, Size >> Dates  EXT: no edema     NST: reactive x 2, reassuring     Assessment and Plan     27-year-old  3 para 2 () currently at 26 6/7 weeks based upon a due date of 2023     Mo/Di twins  Selective FGR twin B with abnormal UA Doppler - Twin A normal, Twin B with intermittent absent and reversed flow in 1 artery but normal to mildly elevated in other artery     Betamethasone completed 2023  S/P Mg prophylaxis  NICU consult completed  Monitoring 1 hour q 8 hours  Repeat UA Doppler twice weekly      Henrry Cochran M.D.  2023

## 2023-07-14 PROCEDURE — 700102 HCHG RX REV CODE 250 W/ 637 OVERRIDE(OP): Performed by: OBSTETRICS & GYNECOLOGY

## 2023-07-14 PROCEDURE — 302790 HCHG STAT ANTEPARTUM CARE, DAILY

## 2023-07-14 PROCEDURE — A9270 NON-COVERED ITEM OR SERVICE: HCPCS | Performed by: OBSTETRICS & GYNECOLOGY

## 2023-07-14 PROCEDURE — 770002 HCHG ROOM/CARE - OB PRIVATE (112)

## 2023-07-14 RX ADMIN — FOLIC ACID 1 MG: 1 TABLET ORAL at 08:00

## 2023-07-14 RX ADMIN — ASPIRIN 81 MG 81 MG: 81 TABLET ORAL at 08:00

## 2023-07-14 RX ADMIN — VITAMIN A, VITAMIN C, VITAMIN D, VITAMIN E, THIAMINE, RIBOFLAVIN, NIACIN, VITAMIN B6, FOLIC ACID, VITAMIN B12, CALCIUM, IRON, ZINC, COPPER 1 TABLET: 4000; 120; 400; 22; 1.84; 3; 20; 10; 1; 12; 200; 27; 25; 2 TABLET ORAL at 07:59

## 2023-07-14 ASSESSMENT — PAIN DESCRIPTION - PAIN TYPE: TYPE: ACUTE PAIN

## 2023-07-14 NOTE — PROGRESS NOTES
MFM NST     Indication: Di/mo twins, FGR twin B  Findings: 120/130, reactive x 2, moderate variability, no decels, reassuring  Plan: 1 hour q 8 hours     Note: NST is from 7/13 at 2200, read today.Pt is on monitor now.

## 2023-07-14 NOTE — PROGRESS NOTES
PROGRESS NOTE    DATE: 23  Hospital day: 7  Gestational Age: 26w6d, Estimated Date of Delivery: 10/13/23  Primary OB/GYN: Daysi Humphreys MD  [facility] Consultant: [facility]    SUBJECTIVE  Vicky Garza is a 27 y.o.  female with Estimated Date of Delivery: 10/13/23 at 26w6d  Admitted for Mo-Di Twin IUP; c/b selective IUGR of Twin B with reverse EDF in one of the arteries but not the other    Patient endorses good fetal movement. Denies contractions, vaginal bleeding, or loss of fluid. Denies headaches, changed in vision, shortness of breath, chest pain, and abdominal pain. Reports she is ambulating without difficulty and voiding spontaneously.     Interval Changes: none, was able to go for a short walk today to the [employer]    OBJECTIVE:  Vitals:    23 1811   BP: 120/67   Pulse: 90   Resp: 17   Temp: 36.3 °C (97.4 °F)   SpO2: 96%          PHYSICAL EXAM:  General:   Alert, conversational, pleasant, no acute distress  Lungs:   No respiratory distress, lungs clear to auscultation bilaterally   Heart:   Regular rate and rhythm   Abdomen:  Soft, gravid, non-tender, non-distended    Last growth scan done  at [facility]   (23w4d)  A 1lb5oz, 38%  B 1lb1oz, 3%  : (25w6d)  A: Breech  B: Vertex, reversed EDF  BMZ complete  and ; also done  and  with previous admission  S/p Mag for neuro protection     FHT: Twin A baseline 140's with mod vari, accels present, no decels  Twin B baseline 130-140 with mod luc, accels present, mild  variable seen, not recurrent    Medications:   Current Facility-Administered Medications   Medication Dose Route Frequency Provider Last Rate Last Admin    aspirin (Asa) chewable tab 81 mg  81 mg Oral DAILY Trinidad Licona M.D.   81 mg at 23 0742    prenatal plus vitamin (Stuartnatal 1+1) 27-1 MG tablet 1 Tablet  1 Tablet Oral Daily-0800 Trinidad Licona M.D.   1 Tablet at 23 0742    folic acid (Folvite) tablet 1 mg  1 mg Oral DAILY Trinidad  MARTITA Licona M.D.   1 mg at 23 0743    lactated ringers infusion   Intravenous Continuous Bailey Ortiz M.D.   Stopped at 23 2130        Labs:   Recent Results (from the past 24 hour(s))   COD - Adult (Type and Screen)    Collection Time: 23  6:31 AM   Result Value Ref Range    ABO Grouping Only A     Rh Grouping Only POS     Antibody Screen-Cod NEG          ASSESSMENT & PLAN  Vicky Garza is a 27 y.o.  at 26w6d   Estimated Date of Delivery: 10/13/23    ASSESSMENT  - Mo-di twin pregnancy  - Selective IUGR in Fetus B with reverse EDF    DISCUSSION: monitoring has been reassuring today    PLAN:  Continue with current plan  Monitoring q8hrs  BPP and dopplers 2x/w per HR  Deliver by c/section for worsening fetal status; previously declined sterilization    Plan of care discussed with the patient and she is in agreement with this plan. Plan of care discussed with RN taking carer of patient. All questions and concerns were answered.     Cammy Martin M.D.

## 2023-07-14 NOTE — CARE PLAN
Problem: Psychosocial - L&D  Goal: Patient's level of anxiety will decrease  Outcome: Progressing     Problem: Pain  Goal: Patient's pain will be alleviated or reduced to the patient’s comfort goal  Outcome: Progressing   The patient is Stable - Low risk of patient condition declining or worsening    Shift Goals  Clinical Goals: healthy babies  Patient Goals: rest  Family Goals: support

## 2023-07-14 NOTE — PROGRESS NOTES
"  Labor and Delivery Antepartum Note    ID: 27 y.o. is a  with IUP at 27w0d    Primary Obstetrician: Daysi Ríos M.D.    Assessing Obstetrician: Daysi Ríos MD    S: Pt doing well. Doing well emotionally with her hospital stay. Her mom is staying here with her. No VB, CTX, or LOF. Good fetal movements for both babies.    ROS: 10 systems negative except as noted above.    O: /67   Pulse 93   Temp 36.3 °C (97.4 °F) (Temporal)   Resp 18   Ht 1.702 m (5' 7\")   Wt 71.7 kg (158 lb)   SpO2 96%   BMI 24.75 kg/m²    Gen: NAD, AAO  HEENT: NC/AT, MMM  Neck: supple  Abd: Gravid, soft, uterus NTTP, no rebound/guarding  Ext: WWP, 2+ DPP, no edema  Skin: no visible rash  Psy: mood good, affect normal and congruent  Pelvic: SVE deferred    NST Report Review:  NST: twins from this morning's monitoring  Baby A 150s, pos accels, neg decels, moderate variability, reactive NST, cat 1  Baby B 140s, pos accels, neg decels, moderate variability, reactive NST, cat 1 FHR  [place]: none    Assessment: Vicky Garza is a 27 y.o.  at 27w0d.    Mono-Di twin pregnancy.  IUGR baby B with abnormal dopplers (absent and reversed flow).  Reactive NST.  S/p BMZ  &  and repeat course  & .    Plan:  Continue antepartum management.  NSTs Q 8hrs per MFM.  Mag sulfate for neuro protection with signs of imminent delivery.  Deliver with signs of fetal distress.      Daysi Ríos M.D., 2023, 1:13 PM     "

## 2023-07-14 NOTE — PROGRESS NOTES
MATERNAL FETAL MEDICINE PROGRESS NOTE    S: No complaints. Good fetal movement x 2.    O:   Vitals:    23 0550   BP: 98/56   Pulse: 82   Resp: 16   Temp: 36.3 °C (97.4 °F)   SpO2:      Exam  GEN: NAD  ABD : Gravid, NT, Size >> Dates  EXT: no edema     NST: reactive x 2, reassuring     Assessment and Plan     27-year-old  3 para 2 () currently at 27 0/7 weeks based upon a due date of 2023     Mo/Di twins  Selective FGR twin B with abnormal UA Doppler - Twin A normal, Twin B with intermittent absent and reversed flow in 1 artery but normal to mildly elevated in other artery     Betamethasone completed 2023  S/P Mg prophylaxis  NICU consult completed  Monitoring 1 hour q 8 hours  Repeat UA Doppler twice weekly        Henrry Cochran M.D.  2023

## 2023-07-14 NOTE — CARE PLAN
The patient is Stable - Low risk of patient condition declining or worsening    Shift Goals  Clinical Goals: healthy babies  Patient Goals: rest  Family Goals: support    Progress made toward(s) clinical / shift goals:    Problem: Knowledge Deficit - L&D  Goal: Patient and family/caregivers will demonstrate understanding of plan of care, disease process/condition, diagnostic tests and medications  Outcome: Progressing     Problem: Psychosocial - L&D  Goal: Patient will be able to discuss coping skills during hospitalization  Outcome: Progressing

## 2023-07-15 LAB — GLUCOSE 1H P 50 G GLC PO SERPL-MCNC: 152 MG/DL (ref 70–139)

## 2023-07-15 PROCEDURE — 302790 HCHG STAT ANTEPARTUM CARE, DAILY

## 2023-07-15 PROCEDURE — 82950 GLUCOSE TEST: CPT

## 2023-07-15 PROCEDURE — 700102 HCHG RX REV CODE 250 W/ 637 OVERRIDE(OP): Performed by: OBSTETRICS & GYNECOLOGY

## 2023-07-15 PROCEDURE — A9270 NON-COVERED ITEM OR SERVICE: HCPCS | Performed by: OBSTETRICS & GYNECOLOGY

## 2023-07-15 PROCEDURE — 770002 HCHG ROOM/CARE - OB PRIVATE (112)

## 2023-07-15 PROCEDURE — 36415 COLL VENOUS BLD VENIPUNCTURE: CPT

## 2023-07-15 RX ADMIN — ASPIRIN 81 MG 81 MG: 81 TABLET ORAL at 09:26

## 2023-07-15 RX ADMIN — FOLIC ACID 1 MG: 1 TABLET ORAL at 09:26

## 2023-07-15 RX ADMIN — VITAMIN A, VITAMIN C, VITAMIN D, VITAMIN E, THIAMINE, RIBOFLAVIN, NIACIN, VITAMIN B6, FOLIC ACID, VITAMIN B12, CALCIUM, IRON, ZINC, COPPER 1 TABLET: 4000; 120; 400; 22; 1.84; 3; 20; 10; 1; 12; 200; 27; 25; 2 TABLET ORAL at 09:26

## 2023-07-15 NOTE — CARE PLAN
The patient is Stable - Low risk of patient condition declining or worsening    Shift Goals  Clinical Goals: reassuring FHT's on baby A and B  Patient Goals: stay pregnant  Family Goals: support    Progress made toward(s) clinical / shift goals:  reactive fetal tracings for gestational age    Patient is not progressing towards the following goals:n/a

## 2023-07-15 NOTE — PROGRESS NOTES
1900- report received from Toña DALLAS  2030- [facility] updated on pt IV, ordered to remove and not replace at this time  2340- [facility] updated on fht and difficulty tracing babies d/t fetal movement. MD reviewed strip and stated pt okay to come off monitor at this time  0700- report given to Oscar DALLAS

## 2023-07-15 NOTE — PROGRESS NOTES
"  Labor and Delivery Antepartum Note    ID: 27 y.o. is a  with IUP at 27w1d    Primary Obstetrician: Daysi Ríos M.D.    Assessing Obstetrician: Bailey Ortiz MD    S: Doing well. Feeling good FM. Denies CTX. IV site from yesterday feeling better. Anxious about US to be completed today.     ROS: 10 systems negative except as noted above.    O: BP 94/58   Pulse 93   Temp 36.2 °C (97.2 °F) (Temporal)   Resp 17   Ht 1.702 m (5' 7\")   Wt 71.7 kg (158 lb)   SpO2 96%   Gen: NAD  HEENT: NC/AT, MMM  Neck: supple  Abd: Gravid, soft, uterus NTTP, no rebound/guarding  Ext: WWP, 2+ DPP, no edema  Skin: Small erythema at site of IV. No exudate. No TTP  Psy: mood good, affect normal and congruent  Pelvic: SVE deferred    NST Report Review:  A 140s with moderate LTV. +accels. No decels  B 150s with moderate LTV. +accels. No decels  [place]: rare    Assessment: Vicky Garza is a 27 y.o.  at 27w1d.    Mono-Di twin pregnancy.  IUGR baby B with abnormal dopplers (absent and reversed flow).  Reactive NST.  S/p BMZ  &  and repeat course  & .  Elevated 1h GTT     Plan:  Continue antepartum management.  NSTs Q 8hrs per MFM.  Mag sulfate for neuro protection with signs of imminent delivery.  Deliver with signs of fetal distress.  Keep COD up to date  IV access not currently in place  SCDs for DVT prevention  Needs fasting 3h GTT tomorrow       Bailey Ortiz M.D., 2023, 1:13 PM     "

## 2023-07-15 NOTE — PROGRESS NOTES
MATERNAL FETAL MEDICINE PROGRESS NOTE    S: No complaints. Good fetal movement x 2. No contractions    O:   Vitals:    07/15/23 0814   BP: 94/58   Pulse: 93   Resp: 17   Temp: 36.2 °C (97.2 °F)   SpO2:        Exam  GEN: NAD  ABD : Gravid, NT, Size >> Dates  EXT: no edema     NST: reactive x 2, reassuring  BPP: 10/10 x 2    Ultrasound/UA Dopplers.   Twin A breech right, DVP 4cm, S/D 4.0  Twin B breech left, DVP 5 cm, UA Doppler with persistent absent EDV, no reversal of flow noted    Assessment and Plan     27-year-old  3 para 2 () currently at 27 1/7 weeks based upon a due date of 2023     Mo/Di twins  Selective FGR twin B with abnormal UA Doppler - Twin A normal, Twin B with prvously intermittent absent and reversed flow in 1 artery but normal to mildly elevated in other artery, reassuring BPP' and FHT at this time.     Betamethasone completed 2023  S/P Mg prophylaxis  NICU consult completed  Monitoring 1 hour q 8 hours  Repeat UA Doppler twice weekly    Henrry Cochran M.D.  7/15/2023

## 2023-07-15 NOTE — PROGRESS NOTES
MFM BPP/UA Dopplers    Ultrasound/UA Dopplers.   Twin A breech right, DVP 4cm, S/D 4.0, BPP 10/10  Twin B breech left, DVP 5 cm, UA Doppler with persistent absent EDV, no reversal of flow noted, BPP 10/10

## 2023-07-15 NOTE — CARE PLAN
The patient is Stable - Low risk of patient condition declining or worsening    Shift Goals  Clinical Goals: stable babies  Patient Goals: comfort  Family Goals: support    Progress made toward(s) clinical / shift goals:    Problem: Knowledge Deficit - L&D  Goal: Patient and family/caregivers will demonstrate understanding of plan of care, disease process/condition, diagnostic tests and medications  Outcome: Progressing     Problem: Psychosocial - L&D  Goal: Patient will be able to discuss coping skills during hospitalization  Outcome: Progressing     Problem: Risk for Injury  Goal: Patient and fetus will be free of preventable injury/complications  Outcome: Progressing

## 2023-07-15 NOTE — PROGRESS NOTES
0700- report received from Radha DALLAS. Plan of care discussed     0800- assessment done. Patient denies any contractions, LOF and VB. States positive fetal movement x2.     0825- patient given glucola for glucose tolerant test.     0930- lab here to draw patient.     1045- Dr Working at bedside. Discussed plan of care and answered questions from patient and . Will preform 3 hour glucose test tomorrow AM    1900- report given to Radha DALLAS

## 2023-07-16 LAB
ABO GROUP BLD: NORMAL
BLD GP AB SCN SERPL QL: NORMAL
GLUCOSE 1H P CHAL SERPL-MCNC: 193 MG/DL (ref 65–199)
GLUCOSE 2H P CHAL SERPL-MCNC: 171 MG/DL (ref 65–139)
GLUCOSE 3H P CHAL SERPL-MCNC: 56 MG/DL (ref 65–109)
GLUCOSE BS SERPL-MCNC: 71 MG/DL (ref 65–99)
RH BLD: NORMAL

## 2023-07-16 PROCEDURE — 302790 HCHG STAT ANTEPARTUM CARE, DAILY

## 2023-07-16 PROCEDURE — 86900 BLOOD TYPING SEROLOGIC ABO: CPT

## 2023-07-16 PROCEDURE — A9270 NON-COVERED ITEM OR SERVICE: HCPCS | Performed by: OBSTETRICS & GYNECOLOGY

## 2023-07-16 PROCEDURE — 36415 COLL VENOUS BLD VENIPUNCTURE: CPT

## 2023-07-16 PROCEDURE — 86901 BLOOD TYPING SEROLOGIC RH(D): CPT

## 2023-07-16 PROCEDURE — 700102 HCHG RX REV CODE 250 W/ 637 OVERRIDE(OP): Performed by: OBSTETRICS & GYNECOLOGY

## 2023-07-16 PROCEDURE — 86850 RBC ANTIBODY SCREEN: CPT

## 2023-07-16 PROCEDURE — 82952 GTT-ADDED SAMPLES: CPT

## 2023-07-16 PROCEDURE — 770002 HCHG ROOM/CARE - OB PRIVATE (112)

## 2023-07-16 PROCEDURE — 82951 GLUCOSE TOLERANCE TEST (GTT): CPT

## 2023-07-16 RX ADMIN — FOLIC ACID 1 MG: 1 TABLET ORAL at 08:14

## 2023-07-16 RX ADMIN — VITAMIN A, VITAMIN C, VITAMIN D, VITAMIN E, THIAMINE, RIBOFLAVIN, NIACIN, VITAMIN B6, FOLIC ACID, VITAMIN B12, CALCIUM, IRON, ZINC, COPPER 1 TABLET: 4000; 120; 400; 22; 1.84; 3; 20; 10; 1; 12; 200; 27; 25; 2 TABLET ORAL at 08:14

## 2023-07-16 RX ADMIN — ASPIRIN 81 MG 81 MG: 81 TABLET ORAL at 08:14

## 2023-07-16 ASSESSMENT — PAIN DESCRIPTION - PAIN TYPE
TYPE: ACUTE PAIN

## 2023-07-16 NOTE — PROGRESS NOTES
1900- report received from Oscar DALLAS  4668- strip reviewed with Working MD, okay to take pt off monitors  0700- report given to Yolie DALLAS

## 2023-07-16 NOTE — PROGRESS NOTES
"  Labor and Delivery Antepartum Note    ID: 27 y.o. is a  with IUP at 27w2d    Primary Obstetrician: Daysi Ríos M.D.    Assessing Obstetrician: Bailey Ortiz MD    S: Doing well. Feeling good FM. Denies CTX. She was happy about dopplers from yesterday. Family (his parents) at bedside     ROS: 10 systems negative except as noted above.    O: /64   Pulse 86   Temp 36.1 °C (97 °F) (Temporal)   Resp 18   Ht 1.702 m (5' 7\")   Wt 71.7 kg (158 lb)   SpO2 96%   Gen: NAD  HEENT: NC/AT, MMM  Neck: supple  Abd: Gravid, soft, uterus NTTP, no rebound/guarding  Ext: WWP, 2+ DPP, no edema  Skin: Small erythema at site of IV. No exudate. No TTP  Psy: mood good, affect normal and congruent  Pelvic: SVE deferred    NST Report Review:  A 150s with moderate LTV. +accels. No decels  B 140s with moderate LTV. +accels. No decels  [place]: rare    Assessment: Vicky Garza is a 27 y.o.  at 27w2d.    Mono-Di twin pregnancy.  IUGR baby B with abnormal dopplers (absent and reversed flow).  Reactive NST.  S/p BMZ  &  and repeat course  & .  Elevated 1h GTT     Plan:  Continue antepartum management.  NSTs Q 8hrs per MFM.  Mag sulfate for neuro protection with signs of imminent delivery.  Deliver with signs of fetal distress.  Keep COD up to date  IV access not currently in place  SCDs for DVT prevention  Needs fasting 3h GTT tomorrow     30 minutes     Bailey Ortiz M.D., 2023, 1:13 PM     "

## 2023-07-16 NOTE — NST NOTE
Vicky Guo Katherinecher   Gestational age: 27w1d   NST @ 1521    Indication:   Mo/Di TIUP  IUGR Fetus B with AEDF    NST Interpretation:  A: 140s with moderate LTV. No decles  B: 150s with moderate LTV. No decels  [place]: no CTX    Bailey Ortiz MD

## 2023-07-16 NOTE — PROGRESS NOTES
MFM NST    Indication: Mo/Di twins, Selective FGR twin B with abnormal Doppler  Findings: 12/130, reactive, moderate variability, no decelerations  Plan: 1 hour NST q 8 hours

## 2023-07-16 NOTE — PROGRESS NOTES
MATERNAL FETAL MEDICINE PROGRESS NOTE    S: No complaints. Good fetal movement x 2. No contractions    O:   Vitals:    23 0742   BP: 108/63   Pulse: 91   Resp: 16   Temp: 36.3 °C (97.3 °F)   SpO2:      Exam  GEN: NAD  ABD : Gravid, NT, Size >> Dates  EXT: no edema     23 - NST: reactive x 2, reassuring     7/15Ultrasound/UA Dopplers.   Twin A breech right, DVP 4cm, S/D 4.0  Twin B breech left, DVP 5 cm, UA Doppler with persistent absent EDV, no reversal of flow noted     Assessment and Plan     27-year-old  3 para 2 () currently at 27 2/7 weeks based upon a due date of 2023     Mo/Di twins  Selective FGR twin B with abnormal UA Doppler - Twin A normal, Twin B with prvously intermittent absent and reversed flow in 1 artery but normal to mildly elevated in other artery on  with reversed EDV but no reversal noted.  Reassuring BPP' and FHT at this time.     Betamethasone completed 2023  S/P Mg prophylaxis  NICU consult completed  Monitoring 1 hour q 8 hours  Repeat UA Doppler twice weekly      Henrry Cochran M.D.  2023

## 2023-07-16 NOTE — NST NOTE
iVcky Guo Katherinecher   Gestational age: 27w1d   NST @ 2208    Indication:   Mo/Di TIUP  IUGR Fetus B with AEDF    NST Interpretation:  A: 140s with moderate LTV. No decles  B: 140s with moderate LTV. No decels  [place]: no CTX    Bailey Ortiz MD

## 2023-07-16 NOTE — CARE PLAN
The patient is Stable - Low risk of patient condition declining or worsening    Shift Goals  Clinical Goals: reassuring fht  Patient Goals: rest  Family Goals: support    Progress made toward(s) clinical / shift goals:    Problem: Psychosocial - L&D  Goal: Patient will be able to discuss coping skills during hospitalization  Outcome: Progressing  Note: Therepeutic communication     Problem: Risk for Injury  Goal: Patient and fetus will be free of preventable injury/complications  Outcome: Progressing  Note: 1 hr monitor q8 hours

## 2023-07-17 PROCEDURE — A9270 NON-COVERED ITEM OR SERVICE: HCPCS | Performed by: OBSTETRICS & GYNECOLOGY

## 2023-07-17 PROCEDURE — 302790 HCHG STAT ANTEPARTUM CARE, DAILY

## 2023-07-17 PROCEDURE — 700102 HCHG RX REV CODE 250 W/ 637 OVERRIDE(OP): Performed by: OBSTETRICS & GYNECOLOGY

## 2023-07-17 PROCEDURE — 770002 HCHG ROOM/CARE - OB PRIVATE (112)

## 2023-07-17 RX ADMIN — ASPIRIN 81 MG 81 MG: 81 TABLET ORAL at 09:05

## 2023-07-17 RX ADMIN — FOLIC ACID 1 MG: 1 TABLET ORAL at 09:06

## 2023-07-17 RX ADMIN — VITAMIN A, VITAMIN C, VITAMIN D, VITAMIN E, THIAMINE, RIBOFLAVIN, NIACIN, VITAMIN B6, FOLIC ACID, VITAMIN B12, CALCIUM, IRON, ZINC, COPPER 1 TABLET: 4000; 120; 400; 22; 1.84; 3; 20; 10; 1; 12; 200; 27; 25; 2 TABLET ORAL at 09:06

## 2023-07-17 ASSESSMENT — PAIN DESCRIPTION - PAIN TYPE: TYPE: ACUTE PAIN

## 2023-07-17 NOTE — ED PROVIDER NOTES
Subjective: The patient is without major complaints  Objective: Vital signs stable patient afebrile  Abdomen: Soft and nontender  Extremities: Nontender without edema  Psych: Alert and oriented x3    27-year-old  3 para 2 () currently at 27 3/7 weeks based upon a due date of 2023     Mo/Di twins  Selective FGR twin B with abnormal UA Doppler - Twin A normal, Twin B with prvously intermittent absent and reversed flow in 1 artery but normal to mildly elevated in other artery on  with reversed EDV but no reversal noted.  Reassuring BPP' and FHT at this time.     Betamethasone completed 2023  S/P Mg prophylaxis  NICU consult completed  Monitoring 1 hour q 8 hours  Repeat UA Doppler twice weekly-to perform Wednesday or Thursday this week.

## 2023-07-17 NOTE — PROGRESS NOTES
1900: Received report from Yolie DALLAS. POC discussed. Pt educated on POC for the night    2318: Pt taken off monitor    0500: Rounded on pt. Pt asleep. No complaints at this time.     0700: Gave report to Princess DALLAS. POC discussed.

## 2023-07-17 NOTE — PROGRESS NOTES
"OB Antepartum Note    Doing well.  Denies CTX, bleeding, LOF. Good FM. Eating and voiding without difficulty. Denies swelling LE.  Denies chest pain or shortness of breath.    /51   Pulse 93   Temp 36.1 °C (97 °F) (Temporal)   Resp 16   Ht 1.702 m (5' 7\")   Wt 71.7 kg (158 lb)   SpO2 96%   BMI 24.75 kg/m²     General: laying in bed comfortably   Abdomen: gravid, non tender  Extremities: no edema    Twin A: baseline 140's, moderate variability present, accelerations present, decelerations absent  Twin B: baseline 140's, moderate variability , accelerations present, decelerations absent  [place]: quiescent    Assessment:   mo/di twin IUP at 27w3d  Selective growth restriction twin B with reversed end diastolic flow    Plan:  Intermittent fetal monitoring with schedule according to [facility] (1 hr q8hr)  S/P BMTZ with subsequent rescue dose completed   Restart mag sulfate if concern for imminent delivery  S/P [place]in consult  COD q72 hrs     Becca Reyes M.D.    "

## 2023-07-17 NOTE — CARE PLAN
Problem: Psychosocial - L&D  Goal: Patient's level of anxiety will decrease  Outcome: Progressing  Note: Pt doesn't express anxiety at this time, all questions and concerns addressed.   Goal: Patient will be able to discuss coping skills during hospitalization  Outcome: Progressing  Note: Pt has no needs or questions at this time.

## 2023-07-17 NOTE — ED PROVIDER NOTES
NST REPORT  Date: July 17, 2023  Indication: Twin gestation with fetal Doppler abnormality  Twin A: 130s to 140s and reactive without decelerations.  Moderate variability.  Twin B: 140s to 150s and reactive without decelerations moderate variability.    [place]: Rare occasional contractions.    Plan: NST 1 hour every 8 hours.  Repeat Doppler later this week.

## 2023-07-17 NOTE — CARE PLAN
The patient is Stable - Low risk of patient condition declining or worsening    Shift Goals  Clinical Goals: Healthy mom and babies, obtain reassuing FHT  Patient Goals: Rest  Family Goals: Support    Progress made toward(s) clinical / shift goals:    Problem: Knowledge Deficit - L&D  Goal: Patient and family/caregivers will demonstrate understanding of plan of care, disease process/condition, diagnostic tests and medications  Outcome: Progressing     Problem: Psychosocial - L&D  Goal: Patient's level of anxiety will decrease  Outcome: Progressing     Problem: Pain  Goal: Patient's pain will be alleviated or reduced to the patient’s comfort goal  Outcome: Progressing       Patient is not progressing towards the following goals:

## 2023-07-17 NOTE — NST NOTE
Vicky Guo Katherinecher   Gestational age: 27w2d   NST @ 1556    Indication:   Mo/Di TIUP  IUGR Fetus B with AEDF    NST Interpretation:  A: 140s with moderate LTV. No decles  B: 140s with moderate LTV. No decels  [place]: no CTX    Bailey Ortiz MD

## 2023-07-17 NOTE — NST NOTE
Vicky Sari Garza   Gestational age: 27w2d   NST @ 2210 to 2314    Indication:   Mo/Di TIUP  IUGR Fetus B with AEDF    NST Interpretation:  A: 150s with moderate LTV. Variable decel to 80 bpm x 30s  B: 150s with moderate LTV. Variable decel to 120 bpm x 30s  [place]: no CTX    Bailey Ortiz MD

## 2023-07-17 NOTE — PROGRESS NOTES
0700: Report received from CELENA Melgar.     0800: Assessment completed, pt declines LOF, VB, UC's, and states +FM. POC discussed, all questions and concerns addressed.     1900:  Report given to Rupali OLIVER RN.

## 2023-07-18 PROCEDURE — A9270 NON-COVERED ITEM OR SERVICE: HCPCS | Performed by: OBSTETRICS & GYNECOLOGY

## 2023-07-18 PROCEDURE — 302790 HCHG STAT ANTEPARTUM CARE, DAILY

## 2023-07-18 PROCEDURE — 770002 HCHG ROOM/CARE - OB PRIVATE (112)

## 2023-07-18 PROCEDURE — 700102 HCHG RX REV CODE 250 W/ 637 OVERRIDE(OP): Performed by: OBSTETRICS & GYNECOLOGY

## 2023-07-18 RX ADMIN — ASPIRIN 81 MG 81 MG: 81 TABLET ORAL at 09:58

## 2023-07-18 RX ADMIN — VITAMIN A, VITAMIN C, VITAMIN D, VITAMIN E, THIAMINE, RIBOFLAVIN, NIACIN, VITAMIN B6, FOLIC ACID, VITAMIN B12, CALCIUM, IRON, ZINC, COPPER 1 TABLET: 4000; 120; 400; 22; 1.84; 3; 20; 10; 1; 12; 200; 27; 25; 2 TABLET ORAL at 09:58

## 2023-07-18 RX ADMIN — FOLIC ACID 1 MG: 1 TABLET ORAL at 09:58

## 2023-07-18 ASSESSMENT — PAIN DESCRIPTION - PAIN TYPE
TYPE: ACUTE PAIN

## 2023-07-18 NOTE — PROGRESS NOTES
0700- Report received from CELENA Zapien. POC discussed. Patient resting in bed with mother at bedside, EFM & toco in place. Adjusted as needed. Denies leaking of fluid, vaginal bleeding, or uterine contractions. Endorses positive fetal movement. Audible fetal movement noted on EFM. Encouraged to call out with needs and concerns.    1900- Report given to CELENA Zapien. POC discussed.

## 2023-07-18 NOTE — ED PROVIDER NOTES
Subjective: Patient is without major complaints.  She denies leakage of fluid or vaginal bleeding.  The patient also denies uterine contractions.  Objective: Vital signs stable patient afebrile  General: Well-developed well-nourished female no acute distress  Abdomen: Soft and nontender  Extremities: Nontender without edema  Psych: Alert and oriented x3    ASSESSMENT and plan     27-year-old  3 para 2 () currently at 27 4/7 weeks based upon a due date of 2023     Mo/Di twins  Selective FGR twin B with abnormal UA Doppler - Twin A normal, Twin B with prvously intermittent absent and reversed flow in 1 artery but normal to mildly elevated in other artery on  with reversed EDV but no reversal noted.  Reassuring BPP' and FHT at this time.     Betamethasone completed 2023  S/P Mg prophylaxis  NICU consult completed  Monitoring 1 hour q 8 hours  Repeat UA Doppler twice weekly-to perform Wednesday

## 2023-07-18 NOTE — CARE PLAN
The patient is [place] - Medium risk of patient condition declining or worsening    Shift Goals  Clinical Goals: healthy mom & baby  Patient Goals: rest  Family Goals: support    Progress made toward(s) clinical / shift goals:        Problem: Knowledge Deficit - L&D  Goal: Patient and family/caregivers will demonstrate understanding of plan of care, disease process/condition, diagnostic tests and medications  Outcome: Progressing     Problem: Psychosocial - L&D  Goal: Patient's level of anxiety will decrease  Outcome: Progressing     Problem: Pain  Goal: Patient's pain will be alleviated or reduced to the patient’s comfort goal  Outcome: Progressing       Patient is not progressing towards the following goals:    N/a.

## 2023-07-18 NOTE — PROGRESS NOTES
1900-Bedside report received from Princess DALLAS. Plan of care discussed.  2220-EFMx2 and TOCO applied for 1 hour fetal monitoring  2335-Fetal strip reviewed and assessed by phil Alonzo to remove monitors  0700-Report given to Silvia DALLAS. Plan of care discussed

## 2023-07-18 NOTE — CARE PLAN
The patient is Stable - Low risk of patient condition declining or worsening    Shift Goals  Clinical Goals: healthy mom and baby  Patient Goals: rest  Family Goals: support    Progress made toward(s) clinical / shift goals:    Problem: Psychosocial - L&D  Goal: Patient's level of anxiety will decrease  Description: Target End Date:  1-3 days or as soon as patient condition allows    1.  Collaborate with patient and family/caregiver to identify triggers and develop strategies to cope with anxiety  2.  Implement stimuli reduction, calming techniques  3.  Pharmacologic management per provider order  4.  Encourage patient/family/care giver participation  5.  Collaborate with interdisciplinary team including Psychologist or Behavioral Health Team as needed  Outcome: Progressing  Note: Pt encouraged to voice feeling and concerns.     Problem: Pain  Goal: Patient's pain will be alleviated or reduced to the patient’s comfort goal  Description: Target End Date:  Prior to discharge or change in level of care    1.  Document pain using the appropriate pain scale per order or unit policy  2.  Administer pain medications per provider order and/or assist with epidural/spinal placement as needed  3.  Pain management medications as ordered  4.  Educate and implement non-pharmacologic comfort measures (i.e. relaxation, distraction, massage, cold/heat therapy, etc.)  5.  Assess for nonverbal signs of ineffective coping with pain and offer pain medication and/or epidural anesthesia  Outcome: Progressing  Note: Pain POC discussed. Pt denies pain.      Problem: Risk for Injury  Goal: Patient and fetus will be free of preventable injury/complications  Description: Target End Date:  Prior to discharge or change in level of care    1.  Monitor uterine activity and if applicable progression of labor  2.  Monitor fetal well being  3.  Assure resuscitative equipment is available and within reach  Outcome: Progressing  Note: Intermittent  monitoring in place       Patient is not progressing towards the following goals:

## 2023-07-18 NOTE — DISCHARGE PLANNING
Discharge Planning Assessment Ante Partum    Reason for Referral: Chart review  Address: [address] [place] [address]  Mom Diagnosis: twin pregnancy-27.4 weeks, selective fetal growth restriction  Primary Language: English    Father of the Baby: Min Garza (: 10/17/89)  Involved in baby’s care? Yes  Contact Information: [phone]    Prenatal Care: Yes-Dr. 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

## 2023-07-18 NOTE — PROGRESS NOTES
PROGRESS NOTE    DATE: 23  Hospital day: 12  Gestational Age: 27+4 weeks  Primary OB/GYN: Daysi Ríos MD  [facility] Consultant: [facility]    Patient ID: Vicky Garza is a 27 y.o.  female with Estimated Date of Delivery: 10/13/23 at 27+4 weeks gestation who was admitted due to selective fetal growth restriction of twin B with abnormal umbilical artery Dopplers.    Subjective: The patient reports that overall she is doing well.  She has no OB complaints at this time.  Her mother is here with her today visiting.    OBJECTIVE:  PHYSICAL EXAM:  Vitals:    23 0800   BP: 107/62   Pulse: 90   Resp: 16   Temp: 36.5 °C (97.7 °F)   SpO2:    General:   Alert, conversational, pleasant, no acute distress  Lungs:   No respiratory distress, lungs clear to auscultation bilaterally   Heart:   Regular rate and rhythm   Abdomen:  Soft, gravid, non-tender, non-distended  Genitourinary: Deferred  Extremities: Moves all, no edema    FHT: FHT of both babies reviewed.  Moderate variability, baselines approximate at 150-155 BPM, ten beat accelerations, no decelerations, rare contractions.    Medications:   Current Facility-Administered Medications   Medication Dose Route Frequency Provider Last Rate Last Admin    aspirin (Asa) chewable tab 81 mg  81 mg Oral DAILY Trinidad Licona M.D.   81 mg at 23 0958    prenatal plus vitamin (Stuartnatal 1+1) 27-1 MG tablet 1 Tablet  1 Tablet Oral Daily-0800 Trinidad Licona M.D.   1 Tablet at 23 0958    folic acid (Folvite) tablet 1 mg  1 mg Oral DAILY Trinidad Licona M.D.   1 mg at 23 0958    lactated ringers infusion   Intravenous Continuous Bailey Ortiz M.D.   Stopped at 23 2130      TREATMENT & PROPHYLAXIS:  Steroids: Rescue course of betamethasone completed on  and .  Magnesium: 12 hours of treatment was given on  for fetal neuro protection.  Antibiotics: Not indicated.    VTE Prophylaxis: Ambulatory.     ASSESSMENT & PLAN:  Vicky  Sari Garza is a 27 y.o.  at 27+4 weeks with monochorionic diamniotic twin gestation (EDC 10/13/2023)    ASSESSMENT:  -  intrauterine pregnancy at 27+4 weeks with monochorionic diamniotic twin gestation.  - Selective fetal growth restriction of twin B with abnormal umbilical artery Dopplers (currently absent end-diastolic flow in 1 artery and forward flow with elevated Dopplers and the other artery).  -Fetal breech malpresentation.    PLAN:  Continue inpatient management of selective fetal growth restriction of twin B with abnormal umbilical artery Dopplers.  1 hour NST every 8 hours.  U/A dopplers twice a week per [facility] (scheduled for tomorrow).  COD every 72 hours.    Plan of care discussed with the patient and she is in agreement with this plan. Plan of care discussed with RN taking carer of patient. All questions and concerns were answered.     Trinidad Licona M.D.

## 2023-07-18 NOTE — ED PROVIDER NOTES
NST REPORT    Indication: Monochorionic/diamniotic twins with fetal growth restriction    Findings:  Twin A: 130-140 without decelerations.  Moderate variability.  Twin B: 130-140 baseline without decelerations.  Moderate variability.      [place]: Rare uterine contractions.    Plan: 1 hour NST every 8 hours.

## 2023-07-19 LAB
ABO GROUP BLD: NORMAL
BLD GP AB SCN SERPL QL: NORMAL
RH BLD: NORMAL

## 2023-07-19 PROCEDURE — A9270 NON-COVERED ITEM OR SERVICE: HCPCS | Performed by: OBSTETRICS & GYNECOLOGY

## 2023-07-19 PROCEDURE — 770002 HCHG ROOM/CARE - OB PRIVATE (112)

## 2023-07-19 PROCEDURE — 36415 COLL VENOUS BLD VENIPUNCTURE: CPT

## 2023-07-19 PROCEDURE — 86850 RBC ANTIBODY SCREEN: CPT

## 2023-07-19 PROCEDURE — 700102 HCHG RX REV CODE 250 W/ 637 OVERRIDE(OP): Performed by: OBSTETRICS & GYNECOLOGY

## 2023-07-19 PROCEDURE — 86900 BLOOD TYPING SEROLOGIC ABO: CPT

## 2023-07-19 PROCEDURE — 302790 HCHG STAT ANTEPARTUM CARE, DAILY

## 2023-07-19 PROCEDURE — 86901 BLOOD TYPING SEROLOGIC RH(D): CPT

## 2023-07-19 RX ADMIN — ASPIRIN 81 MG 81 MG: 81 TABLET ORAL at 10:34

## 2023-07-19 RX ADMIN — FOLIC ACID 1 MG: 1 TABLET ORAL at 10:34

## 2023-07-19 RX ADMIN — VITAMIN A, VITAMIN C, VITAMIN D, VITAMIN E, THIAMINE, RIBOFLAVIN, NIACIN, VITAMIN B6, FOLIC ACID, VITAMIN B12, CALCIUM, IRON, ZINC, COPPER 1 TABLET: 4000; 120; 400; 22; 1.84; 3; 20; 10; 1; 12; 200; 27; 25; 2 TABLET ORAL at 10:34

## 2023-07-19 ASSESSMENT — PATIENT HEALTH QUESTIONNAIRE - PHQ9
2. FEELING DOWN, DEPRESSED, IRRITABLE, OR HOPELESS: NOT AT ALL
SUM OF ALL RESPONSES TO PHQ9 QUESTIONS 1 AND 2: 0
SUM OF ALL RESPONSES TO PHQ9 QUESTIONS 1 AND 2: 0
1. LITTLE INTEREST OR PLEASURE IN DOING THINGS: NOT AT ALL
1. LITTLE INTEREST OR PLEASURE IN DOING THINGS: NOT AT ALL
2. FEELING DOWN, DEPRESSED, IRRITABLE, OR HOPELESS: NOT AT ALL

## 2023-07-19 NOTE — ED PROVIDER NOTES
MFM REPORT  Subjective: No major complaints.  Denies leakage of fluid or vaginal bleeding.  Objective: Vital signs stable patient afebrile  General: Well-developed well-nourished female no acute distress  Abdomen: Soft nontender  Extremities: Nontender without edema  Psych: Alert and oriented x3  ASSESSMENT and PLAN    27-year-old  3 para 2 () currently at 27 5/7 weeks based upon a due date of 2023     Mo/Di twins  Selective FGR twin B with abnormal UA Doppler - Twin A normal, Twin B with bilateral absent umbilical artery Doppler diastolic flow   -betamethasone completed 2023, second course  NICU consult completed  Monitoring 1 hour q 8 hours  Repeat UA Doppler twice weekly

## 2023-07-19 NOTE — ED PROVIDER NOTES
NST REPORT    Indication: Twins, fetal growth restriction twin B, abnormal Doppler  Findings:  Twin A: 130s to 140s with moderate variability and without decelerations  Twin B: 130s with moderate variability and without decelerations.  [place]: No regular contractions.  Plan: 1 hour NST every 8 hours

## 2023-07-19 NOTE — PROGRESS NOTES
Antepartum Progress Note    Vicky Garza   27w5d      Subjective:  Pt has no complaints. Good fm x 2, no leaking fluid, bleeding, She is having normal bowel movements, voiding w/out difficulty, showering and moving about in her room at will. Family in room with her.     Objective:   Vitals:    23 1642 23 2000 23 0402 23 0750   BP: 107/65 107/65 95/54 107/67   Pulse: 86 84 74 90   Resp: 16 16 16 18   Temp: 36.3 °C (97.4 °F) 36.3 °C (97.4 °F) 36.1 °C (96.9 °F) 36.3 °C (97.3 °F)   TempSrc: Temporal Temporal Temporal Temporal   SpO2:  93%  95%   Weight:       Height:         Gen: no acute distress  Abd: gravid nt/nd  Ext: no calf pain walter LE  Fetal nst today.    Twin A moderate variability no decels   Twin B moderate variability no decels   [place]: irritability but no regular ctxns seen    Labs:  No results found for this or any previous visit (from the past 24 hour(s)).    Assessment: plan  27-year-old  3 para 2  at 27 5/7 weeks based upon a EDC 2023     Mo/Di twins  Selective Growth restriction of twin B with abnormal UA Doppler - Twin A normal, Twin B with bilateral absent umbilical artery Doppler diastolic flow   S/p betamethasone completed 2023, second course. S/p Magnesium sulfate on  for neuro protection.  NICU consult completed  Monitoring 1 hour q 8 hours  HRPC following and repeating UA Doppler twice weekly.  VTE prophylaxis: Ambulatory  COD q 72 hr.   Continue current plan of care.        Amira Youssef M.D.

## 2023-07-19 NOTE — PROGRESS NOTES
0700- Report received from CELENA Zapien. POC discussed. Patient in bed in no overt distress, EFM & toco in place for monitoring. Adjusted as needed. Encouraged to call out with needs or concerns.

## 2023-07-19 NOTE — CARE PLAN
The patient is Stable - Low risk of patient condition declining or worsening    Shift Goals  Clinical Goals: fetal monitoring  Patient Goals: rest  Family Goals: support    Progress made toward(s) clinical / shift goals:    Problem: Pain  Goal: Patient's pain will be alleviated or reduced to the patient’s comfort goal  Description: Target End Date:  Prior to discharge or change in level of care    1.  Document pain using the appropriate pain scale per order or unit policy  2.  Administer pain medications per provider order and/or assist with epidural/spinal placement as needed  3.  Pain management medications as ordered  4.  Educate and implement non-pharmacologic comfort measures (i.e. relaxation, distraction, massage, cold/heat therapy, etc.)  5.  Assess for nonverbal signs of ineffective coping with pain and offer pain medication and/or epidural anesthesia  Outcome: Progressing  Note: Pain POC discussed. Pt denies pain.     Problem: Risk for Injury  Goal: Patient and fetus will be free of preventable injury/complications  Description: Target End Date:  Prior to discharge or change in level of care    1.  Monitor uterine activity and if applicable progression of labor  2.  Monitor fetal well being  3.  Assure resuscitative equipment is available and within reach  Outcome: Progressing  Note: Intermittent monitoring in place.       Patient is not progressing towards the following goals:

## 2023-07-19 NOTE — ED PROVIDER NOTES
ULTRASOUND REPORT    Twin A: Normal umbilical artery Doppler ranging from 2.7-3.1  Twin B: Absent end-diastolic velocity on both umbilical arteries.  Ductus venosus with normal flow and no reversed flow.

## 2023-07-19 NOTE — PROGRESS NOTES
1900-Report received from Silvia DALLAS. Plan of care discussed  2224-EFM x2 and TOCO applied for 1 hour monitoring  2332-Fetal monitoring complete  0636-EFM x2 and TOCO applied for 1 hour monitoring  0700-Report given to Silvia DALLAS. Plan of care discussed.

## 2023-07-20 PROCEDURE — A9270 NON-COVERED ITEM OR SERVICE: HCPCS | Performed by: OBSTETRICS & GYNECOLOGY

## 2023-07-20 PROCEDURE — 700102 HCHG RX REV CODE 250 W/ 637 OVERRIDE(OP): Performed by: OBSTETRICS & GYNECOLOGY

## 2023-07-20 PROCEDURE — 302790 HCHG STAT ANTEPARTUM CARE, DAILY

## 2023-07-20 PROCEDURE — 770002 HCHG ROOM/CARE - OB PRIVATE (112)

## 2023-07-20 RX ADMIN — VITAMIN A, VITAMIN C, VITAMIN D, VITAMIN E, THIAMINE, RIBOFLAVIN, NIACIN, VITAMIN B6, FOLIC ACID, VITAMIN B12, CALCIUM, IRON, ZINC, COPPER 1 TABLET: 4000; 120; 400; 22; 1.84; 3; 20; 10; 1; 12; 200; 27; 25; 2 TABLET ORAL at 11:54

## 2023-07-20 RX ADMIN — FOLIC ACID 1 MG: 1 TABLET ORAL at 11:54

## 2023-07-20 RX ADMIN — ASPIRIN 81 MG 81 MG: 81 TABLET ORAL at 11:54

## 2023-07-20 NOTE — PROGRESS NOTES
MFM Report    Subjective: No events. + FM x 2. Denies VB, LOF, ctx.    Objective:   Vitals:    230   BP: 102/65   Pulse: 80   Resp: 17   Temp: 36.3 °C (97.4 °F)   SpO2:      General: Well-developed well-nourished female no acute distress  Abdomen: Soft nontender  Extremities: Nontender without edema  Psych: Alert and oriented x3    27-year-old  3 para 2 () currently at 27 6/7 weeks based upon a due date of 2023     Mo/Di twins  Selective FGR twin B with abnormal UA Doppler - Twin A normal, Twin B with bilateral absent umbilical artery Doppler diastolic flow   -betamethasone completed 2023, second course  NICU consult completed  Monitoring 1 hour q 8 hours  Repeat UA Doppler twice weekly    Sugey Gonzalez M.D.

## 2023-07-20 NOTE — NST NOTE
Vicky Garza   Gestational age: 27w6d     Indication: mono-di twins, FGR    NST Interpretation:  Twin A: 120s to 130s with moderate variability and without decelerations, + accles  Twin B: 130s to 140s with moderate variability and without decelerations.  [place]: No regular contractions.  Plan: 1 hour NST every 8h

## 2023-07-20 NOTE — CARE PLAN
The patient is Stable - Low risk of patient condition declining or worsening    Shift Goals  Clinical Goals: fetal monitoring  Patient Goals: rest  Family Goals: provide emotional support      Problem: Knowledge Deficit - L&D  Goal: Patient and family/caregivers will demonstrate understanding of plan of care, disease process/condition, diagnostic tests and medications  Outcome: Progressing     Problem: Risk for Injury  Goal: Patient and fetus will be free of preventable injury/complications  Outcome: Progressing

## 2023-07-20 NOTE — PROGRESS NOTES
1900 report received from Silvia ROSA RN    2232 EFM x2 and TOCO applied for 1 hr monitoring    2344 1 hr monitoring complete     0700 report to Silvia ROSA RN

## 2023-07-20 NOTE — CARE PLAN
The patient is Watcher - Medium risk of patient condition declining or worsening    Shift Goals  Clinical Goals: fetal monitoring  Patient Goals: rest  Family Goals: support    Progress made toward(s) clinical / shift goals:        Problem: Knowledge Deficit - L&D  Goal: Patient and family/caregivers will demonstrate understanding of plan of care, disease process/condition, diagnostic tests and medications  Outcome: Progressing     Problem: Psychosocial - L&D  Goal: Patient's level of anxiety will decrease  Outcome: Progressing  Goal: Patient will be able to discuss coping skills during hospitalization  Outcome: Progressing       Patient is not progressing towards the following goals:  N/a.

## 2023-07-20 NOTE — PROGRESS NOTES
0700- Report received from CELENA Monahan. POC discussed.     1900- Report given to CELENA Monahan.

## 2023-07-21 PROCEDURE — A9270 NON-COVERED ITEM OR SERVICE: HCPCS | Performed by: OBSTETRICS & GYNECOLOGY

## 2023-07-21 PROCEDURE — 700102 HCHG RX REV CODE 250 W/ 637 OVERRIDE(OP): Performed by: OBSTETRICS & GYNECOLOGY

## 2023-07-21 PROCEDURE — 770002 HCHG ROOM/CARE - OB PRIVATE (112)

## 2023-07-21 PROCEDURE — 59025 FETAL NON-STRESS TEST: CPT

## 2023-07-21 PROCEDURE — 302790 HCHG STAT ANTEPARTUM CARE, DAILY

## 2023-07-21 RX ADMIN — ASPIRIN 81 MG 81 MG: 81 TABLET ORAL at 10:44

## 2023-07-21 RX ADMIN — FOLIC ACID 1 MG: 1 TABLET ORAL at 10:44

## 2023-07-21 RX ADMIN — VITAMIN A, VITAMIN C, VITAMIN D, VITAMIN E, THIAMINE, RIBOFLAVIN, NIACIN, VITAMIN B6, FOLIC ACID, VITAMIN B12, CALCIUM, IRON, ZINC, COPPER 1 TABLET: 4000; 120; 400; 22; 1.84; 3; 20; 10; 1; 12; 200; 27; 25; 2 TABLET ORAL at 10:44

## 2023-07-21 NOTE — PROGRESS NOTES
1900 report received from Silvia ROSA RN    2244 TOCO and EFM x2 applied for 1 hour monitoring     4392 1 hour monitoring complete

## 2023-07-21 NOTE — CARE PLAN
The patient is Watcher - Medium risk of patient condition declining or worsening    Shift Goals  Clinical Goals: fetal monitoring  Patient Goals: rest  Family Goals: provide emotional support    Progress made toward(s) clinical / shift goals:        Problem: Knowledge Deficit - L&D  Goal: Patient and family/caregivers will demonstrate understanding of plan of care, disease process/condition, diagnostic tests and medications  Outcome: Progressing     Problem: Psychosocial - L&D  Goal: Patient's level of anxiety will decrease  Outcome: Progressing  Goal: Patient will be able to discuss coping skills during hospitalization  Outcome: Progressing       Patient is not progressing towards the following goals:    N/a.

## 2023-07-21 NOTE — PROGRESS NOTES
PROGRESS NOTE    Date: 23  Hospital day: 14  Gestational Age: 27w6d   Primary OB/GYN: Daysi Ríos MD  Assessing OB/GYN: Brando Smith M.D.   [facility] Consultant: [facility]    Patient ID: Vicky Garza is a 27 y.o.  female with Estimated Date of Delivery: 10/13/23 at 27w6d gestation who was admitted due to selective fetal growth restriction of twin B with abnormal umbilical artery Dopplers.    Subjective: The patient feels well.  She endorses normal fetal movement for both babies.  She denies leaking of fluid, bleeding, or contractions.  No specific complaints today.    OBJECTIVE:  PHYSICAL EXAM:  Vitals:    23 1154   BP: 114/72   Pulse: 85   Resp: 18   Temp: 36.4 °C (97.5 °F)   SpO2: 95%   General: Alert, conversational, pleasant, no acute distress  Lungs:  No respiratory distress   Heart:   Regular rate and rhythm   Abdomen:  Soft, gravid, non-tender, non-distended  Genitourinary: Deferred  Extremities: Moves all, no edema, 2+DPP    FHT: See separate NST note.    Medications:   Current Facility-Administered Medications   Medication Dose Route Frequency Provider Last Rate Last Admin    aspirin (Asa) chewable tab 81 mg  81 mg Oral DAILY Trinidad Licona M.D.   81 mg at 23 1154    prenatal plus vitamin (Stuartnatal 1+1) 27-1 MG tablet 1 Tablet  1 Tablet Oral Daily-0800 Trinidad Licona M.D.   1 Tablet at 23 1154    folic acid (Folvite) tablet 1 mg  1 mg Oral DAILY Trinidad Licona M.D.   1 mg at 23 1154    lactated ringers infusion   Intravenous Continuous Bailey Ortiz M.D.   Stopped at 23 2130      TREATMENT & PROPHYLAXIS:  Steroids: Rescue course of betamethasone completed on  and .  Magnesium: 12 hours of treatment was given on  for fetal neuro protection, discontinued currently.  Antibiotics: Not indicated.    VTE Prophylaxis: Ambulatory.   NICU consult: completed    ULTRASOUND REPORT (2023, report reviewed)  Twin A: Normal umbilical artery  Doppler ranging from 2.7-3.1  Twin B: Absent end-diastolic velocity on both umbilical arteries.  Ductus venosus with normal flow and no reversed flow.    ASSESSMENT & PLAN:  Vicky Garza is a 27 y.o.  at 27w6d with monochorionic diamniotic twin gestation (EDC 10/13/2023)    ASSESSMENT:  -  intrauterine pregnancy at 27w6d with monochorionic-diamniotic twin gestation.  - Selective fetal growth restriction of twin B with abnormal umbilical artery Dopplers (most recently absent end-diastolic flow in both arteries, yesterday).  -Fetal breech malpresentation (though position was not noted on yesterday's umbilical Doppler U/S).    PLAN:  Continue inpatient management for selective fetal growth restriction of twin B with abnormal umbilical artery Dopplers.  1 hour NST every 8 hours.  U/A dopplers twice a week per MFM (next on Sat).  COD every 72 hours (ordered for Sat)    Plan of care discussed with the patient, her nurse and in person with Dr. Gonzalez this am. All questions and concerns were answered.     Brando Smith MD, MS, FACOG, 2023

## 2023-07-21 NOTE — PROGRESS NOTES
MFM Report    Subjective: No events. + FM x 2. Denies VB, LOF, ctx.    Objective:   Vitals:    23 0540   BP: (!) 89/50   Pulse: 86   Resp: 16   Temp: 36.4 °C (97.6 °F)   SpO2:      General: Well-developed well-nourished female no acute distress  Abdomen: Soft nontender  Extremities: Nontender without edema  Psych: Alert and oriented x3    27-year-old  3 para 2 () currently at 28 0/7 weeks based upon a due date of 2023     Mo/Di twins  Selective FGR twin B with abnormal UA Doppler - Twin A normal, Twin B with bilateral absent umbilical artery Doppler diastolic flow   -betamethasone completed 2023, second course  NICU consult completed  Monitoring 1 hour q 8 hours  Repeat UA Doppler twice weekly    Sugey Gonzalez M.D.

## 2023-07-21 NOTE — NST NOTE
Vicky Garza   Gestational age: 28w0d     Indication: mono-di twins, FGR    NST Interpretation:  Twin A: 130s to 140s with moderate variability and without decelerations, + accels  Twin B: 130s to 140s with moderate variability and without decelerations.  [place]: No regular contractions.  Plan: 1 hour NST every 8h

## 2023-07-21 NOTE — PROGRESS NOTES
"OBPN    Reports \"nothing new\", feels well, babies moving, no bleeding or leaking    Vitals:    23 1154 23 1920 23 0540 23 0818   BP: 114/72 100/62 (!) 89/50 103/58   Pulse: 85 84 86 82   Resp: 18 17 16 16   Temp: 36.4 °C (97.5 °F) 36.4 °C (97.6 °F) 36.4 °C (97.6 °F) 36.3 °C (97.3 °F)   TempSrc: Temporal Temporal Temporal Temporal   SpO2: 95%      Weight:       Height:          Gen-comfortable  Abd-soft, nt, monitors in place    EFM-reactive x 2    ASSESSMENT  27-year-old  3 para 2 () currently at 28 0/7 weeks based upon a due date of 2023     Mo/Di twins  Selective FGR twin B with abnormal UA Doppler - Twin A normal, Twin B with bilateral absent umbilical artery Doppler diastolic flow   -betamethasone completed 2023, second course  NICU consult completed    PLAN  Monitoring 1 hour q 8 hours  Repeat UA Doppler twice weekly per MFM  "

## 2023-07-22 LAB
ABO GROUP BLD: NORMAL
BLD GP AB SCN SERPL QL: NORMAL
RH BLD: NORMAL

## 2023-07-22 PROCEDURE — 59025 FETAL NON-STRESS TEST: CPT

## 2023-07-22 PROCEDURE — 302790 HCHG STAT ANTEPARTUM CARE, DAILY

## 2023-07-22 PROCEDURE — 86850 RBC ANTIBODY SCREEN: CPT

## 2023-07-22 PROCEDURE — 770002 HCHG ROOM/CARE - OB PRIVATE (112)

## 2023-07-22 PROCEDURE — 36415 COLL VENOUS BLD VENIPUNCTURE: CPT

## 2023-07-22 PROCEDURE — 700102 HCHG RX REV CODE 250 W/ 637 OVERRIDE(OP): Performed by: OBSTETRICS & GYNECOLOGY

## 2023-07-22 PROCEDURE — 86900 BLOOD TYPING SEROLOGIC ABO: CPT

## 2023-07-22 PROCEDURE — A9270 NON-COVERED ITEM OR SERVICE: HCPCS | Performed by: OBSTETRICS & GYNECOLOGY

## 2023-07-22 PROCEDURE — 86901 BLOOD TYPING SEROLOGIC RH(D): CPT

## 2023-07-22 RX ADMIN — FOLIC ACID 1 MG: 1 TABLET ORAL at 07:49

## 2023-07-22 RX ADMIN — ASPIRIN 81 MG 81 MG: 81 TABLET ORAL at 07:50

## 2023-07-22 RX ADMIN — VITAMIN A, VITAMIN C, VITAMIN D, VITAMIN E, THIAMINE, RIBOFLAVIN, NIACIN, VITAMIN B6, FOLIC ACID, VITAMIN B12, CALCIUM, IRON, ZINC, COPPER 1 TABLET: 4000; 120; 400; 22; 1.84; 3; 20; 10; 1; 12; 200; 27; 25; 2 TABLET ORAL at 07:49

## 2023-07-22 NOTE — NST NOTE
Vicky Garza   Gestational age: 28w1d     Indication: mono-di twins, FGR    NST Interpretation:  Twin A: 130s with moderate variability and without decelerations, + accels  Twin B: 150s with moderate variability and without decelerations.    [place]: No regular contractions.  Plan: 1 hour NST every 8h

## 2023-07-22 NOTE — CARE PLAN
The patient is Watcher - Medium risk of patient condition declining or worsening    Shift Goals  Clinical Goals: fetal monitoring  Patient Goals: stay pregnant  Family Goals: support    Progress made toward(s) clinical / shift goals:      Problem: Knowledge Deficit - L&D  Goal: Patient and family/caregivers will demonstrate understanding of plan of care, disease process/condition, diagnostic tests and medications  Outcome: Progressing     Problem: Psychosocial - L&D  Goal: Patient's level of anxiety will decrease  Outcome: Progressing     Problem: Risk for Injury  Goal: Patient and fetus will be free of preventable injury/complications  Outcome: Progressing     Patient is not progressing towards the following goals: n/a

## 2023-07-22 NOTE — PROGRESS NOTES
Laboratory Monitoring for Antipsychotics: This patient is currently prescribed the following medication(s):   Current Facility-Administered Medications   Medication Dose Route Frequency    ARIPiprazole (ABILIFY) tablet 15 mg  15 mg Oral DAILY    Or    haloperidol lactate (HALDOL) injection 5 mg  5 mg IntraMUSCular DAILY     The following labs have been completed for monitoring of antipsychotics and/or mood stabilizers:    Height, Weight, BMI Estimation  Estimated body mass index is 24.41 kg/m² as calculated from the following:    Height as of an earlier encounter on 10/28/20: 185.4 cm (73\"). Weight as of an earlier encounter on 10/28/20: 83.9 kg (185 lb). Vital Signs/Blood Pressure  Visit Vitals  /83 (BP 1 Location: Left arm, BP Patient Position: Sitting)   Pulse 82   Temp 99.1 °F (37.3 °C)   Resp 16   SpO2 100%     Renal Function, Hepatic Function and Chemistry  CrCl cannot be calculated (Unknown ideal weight.). Lab Results   Component Value Date/Time    Sodium 134 (L) 10/28/2020 05:35 PM    Potassium 4.0 10/28/2020 05:35 PM    Chloride 101 10/28/2020 05:35 PM    CO2 28 10/28/2020 05:35 PM    Anion gap 5 10/28/2020 05:35 PM    BUN 12 10/28/2020 05:35 PM    Creatinine 1.30 10/28/2020 05:35 PM    BUN/Creatinine ratio 9 (L) 10/28/2020 05:35 PM    Bilirubin, total 0.6 10/28/2020 05:35 PM    Protein, total 7.9 10/28/2020 05:35 PM    Albumin 4.4 10/28/2020 05:35 PM    Globulin 3.5 10/28/2020 05:35 PM    A-G Ratio 1.3 10/28/2020 05:35 PM    ALT (SGPT) 59 10/28/2020 05:35 PM    AST (SGOT) 49 (H) 10/28/2020 05:35 PM    Alk.  phosphatase 85 10/28/2020 05:35 PM     Lab Results   Component Value Date/Time    Glucose 95 10/28/2020 05:35 PM     Lab Results   Component Value Date/Time    Hemoglobin A1c 5.7 (H) 10/31/2020 06:11 AM     Hematology  Lab Results   Component Value Date/Time    WBC 12.1 (H) 10/28/2020 05:35 PM    RBC 5.12 10/28/2020 05:35 PM    HGB 15.9 10/28/2020 05:35 PM    HCT 47.0 10/28/2020 05:35 MFM Report    Subjective: No events. + FM x 2. Denies VB, LOF, ctx.    Objective:   Vitals:    23 0806   BP: 97/72   Pulse: (!) 107   Resp: 16   Temp: 36.3 °C (97.4 °F)   SpO2:      General: Well-developed well-nourished female no acute distress  Abdomen: Soft nontender  Extremities: Nontender without edema  Psych: Alert and oriented x3    27-year-old  3 para 2 () currently at 28 1/7 weeks based upon a due date of 2023     Mo/Di twins  Selective FGR twin B with abnormal UA Doppler - Twin A elevated S/D, Twin B with bilateral absent umbilical artery Doppler diastolic flow   -betamethasone completed 2023, second course  NICU consult completed  Monitoring 1 hour q 8 hours  Repeat UA Doppler twice weekly, growth every 3-4 weeks (can do next weekend)    Sugey Gonzalez M.D.   PM    MCV 91.8 10/28/2020 05:35 PM    MCH 31.1 10/28/2020 05:35 PM    MCHC 33.8 10/28/2020 05:35 PM    RDW 12.6 10/28/2020 05:35 PM    PLATELET 539 43/05/4404 05:35 PM     Lipids  Lab Results   Component Value Date/Time    Cholesterol, total 147 10/31/2020 06:11 AM    HDL Cholesterol 63 10/31/2020 06:11 AM    LDL, calculated 69.2 10/31/2020 06:11 AM    Triglyceride 74 10/31/2020 06:11 AM    CHOL/HDL Ratio 2.3 10/31/2020 06:11 AM     Thyroid Function  Lab Results   Component Value Date/Time    TSH 1.93 10/31/2020 06:11 AM     Assessment/Plan:  Recommended baseline laboratory monitoring has been completed based on this patient's current medication regimen. No further interventions are needed at this time. Follow-up metabolic monitoring labs should be completed in 3 months (quarterly for the first year of antipsychotic therapy).      Omer Huffman, PHARMD

## 2023-07-22 NOTE — PROCEDURES
Fetal Non-Stress Test    Date: 7/10/2023, 12:00p    Twin A  Baseline: 150bpm  Variability: moderate  Accelerations: present  Decelerations: absent  Impression: Reactive NST for gestational age.    Twin B  Baseline: 155bpm  Variability: moderate  Accelerations: present  Decelerations: absent  Impression: Reactive NST for gestational age.    [place]: CTX absent    Brando Smith MD, MS, 7/10/2023  
  Fetal Non-Stress Tests    Date: 7/20/2023, 15:30    Twin A  Baseline: 135bpm  Variability: moderate  Accelerations: present  Decelerations: absent  Impression: Reactive NST for gestational age.    Twin B  Baseline: 140bpm  Variability: moderate  Accelerations: present  Decelerations: absent  Impression: Reactive NST for gestational age.    [place]: CTX absent    Brando Smith MD, MS, 7/20/2023   
UA Dopplers    Indications: mono-di twins, FGR twin B, abnormal UA Doppler    A: Vtx maternal right,  bpm, DVP 4.1cm UA Doppler S/D ratio 6.1, 5.4 (elevated)    B: Breech maternal left, HR 141bpm, DVP 4.0cm, UA Doppler S/D AEDF in both arteries  
Implemented All Fall Risk Interventions:  [place] to call system. Call bell, personal items and telephone within reach. Instruct patient to call for assistance. Room bathroom lighting operational. Non-slip footwear when patient is off stretcher. Physically safe environment: no spills, clutter or unnecessary equipment. Stretcher in lowest position, wheels locked, appropriate side rails in place. Provide visual cue, wrist band, yellow gown, etc. Monitor gait and stability. Monitor for mental status changes and reorient to person, place, and time. Review medications for side effects contributing to fall risk. Reinforce activity limits and safety measures with patient and family.

## 2023-07-23 PROCEDURE — 302790 HCHG STAT ANTEPARTUM CARE, DAILY

## 2023-07-23 PROCEDURE — 770002 HCHG ROOM/CARE - OB PRIVATE (112)

## 2023-07-23 PROCEDURE — 700102 HCHG RX REV CODE 250 W/ 637 OVERRIDE(OP): Performed by: OBSTETRICS & GYNECOLOGY

## 2023-07-23 PROCEDURE — A9270 NON-COVERED ITEM OR SERVICE: HCPCS | Performed by: OBSTETRICS & GYNECOLOGY

## 2023-07-23 RX ADMIN — VITAMIN A, VITAMIN C, VITAMIN D, VITAMIN E, THIAMINE, RIBOFLAVIN, NIACIN, VITAMIN B6, FOLIC ACID, VITAMIN B12, CALCIUM, IRON, ZINC, COPPER 1 TABLET: 4000; 120; 400; 22; 1.84; 3; 20; 10; 1; 12; 200; 27; 25; 2 TABLET ORAL at 09:59

## 2023-07-23 RX ADMIN — FOLIC ACID 1 MG: 1 TABLET ORAL at 09:59

## 2023-07-23 RX ADMIN — ASPIRIN 81 MG 81 MG: 81 TABLET ORAL at 09:59

## 2023-07-23 ASSESSMENT — PAIN DESCRIPTION - PAIN TYPE
TYPE: ACUTE PAIN

## 2023-07-23 NOTE — PROGRESS NOTES
Progress Note    Subjective: 27-year-old -0-0-2 28 weeks and 1 day gestation.  Intrauterine twin pregnancy with monochorionic diamniotic.  Patient has been admitted in-house for close observation and monitoring.  She is currently without complaints.  She reports good fetal movement.  No contractions no vaginal bleeding or loss of fluid.  She denies headaches nausea vomiting chest pains    Objective Data:            Vitals:    23 1930 23 0600 23 0806 23 1209   BP: 121/70 97/60 97/72 116/64   Pulse: (!) 105 84 (!) 107 96   Resp: 17 16 16 (!) 1   Temp: 36.2 °C (97.2 °F) 36.3 °C (97.4 °F) 36.3 °C (97.4 °F) 36.1 °C (97 °F)   TempSrc: Temporal Temporal Temporal Temporal   SpO2:       Weight:       Height:           Well-developed well-nourished female no apparent distress  Abdomen is gravid and soft    Fetal heart tones baby A: Baseline 130s reactive, no decelerations moderate variability  Fetal heart tones baby B: Baseline 130s reactive moderate variability no decelerations        Current Facility-Administered Medications   Medication Dose Route Frequency Provider Last Rate Last Admin    aspirin (Asa) chewable tab 81 mg  81 mg Oral DAILY Trinidad Licona M.D.   81 mg at 23 0750    prenatal plus vitamin (Stuartnatal 1+1) 27-1 MG tablet 1 Tablet  1 Tablet Oral Daily-0800 Trinidad Licona M.D.   1 Tablet at 23 0749    folic acid (Folvite) tablet 1 mg  1 mg Oral DAILY Trinidad Licona M.D.   1 mg at 23 0749    lactated ringers infusion   Intravenous Continuous Bailey Ortiz M.D.   Stopped at 23 2130       Assessment: 27-year-old G3, P2 28 weeks 1/7 days  Twin gestation monochorionic diamniotic  Selective fetal growth restriction in twin B with absent end-diastolic flow in the umbilical artery  Patient is received betamethasone as well as a rescue dose      Plan: Plan per perinatology  Fetal monitoring 3 times daily  UA Dopplers twice weekly with growth scans 3 to 4  weeks  We will deliver for worsening fetal status  We will need to start magnesium for neuro protection in the event of delivery  Patient understands the need for  delivery.

## 2023-07-23 NOTE — PROGRESS NOTES
MFM Report    Subjective: No events. + FM x 2. Denies VB, LOF, ctx.    Objective:   Vitals:    23 0730   BP: 105/55   Pulse: 79   Resp: 16   Temp: 36.2 °C (97.2 °F)   SpO2: 95%     General: Well-developed well-nourished female no acute distress  Abdomen: Soft nontender  Extremities: Nontender without edema  Psych: Alert and oriented x3    27-year-old  3 para 2 () currently at 28 2/7 weeks based upon a due date of 2023     Mo/Di twins  Selective FGR twin B with abnormal UA Doppler - Twin A elevated S/D, Twin B with bilateral absent umbilical artery Doppler diastolic flow   -betamethasone completed 2023, second course  NICU consult completed  Monitoring 1 hour q 8 hours  Repeat UA Doppler twice weekly, growth every 3-4 weeks (can do next weekend)    Sugey Gonzalez M.D.

## 2023-07-23 NOTE — PROGRESS NOTES
1900: report received from Toña BRITT    1930: In to see pt; pt out off unit with family.    2030: pt back on unit.  2330: monitors placed for evening NST.    0100: pt and spouse resting quietly.    0710: report to Kyleigh DALLAS

## 2023-07-23 NOTE — NST NOTE
Vicky Garza   Gestational age: 28w2d     Indication: mono-di twins, FGR    NST Interpretation:  Twin A: 140s with moderate variability and without decelerations, + accels  Twin B: 145s with moderate variability with occasional variables (AGA), + accels    [place]: No regular contractions.  Plan: 1 hour NST every 8h

## 2023-07-23 NOTE — PROGRESS NOTES
Progress Note    Subjective: Patient is doing well today.  Without complaints.  She does report good fetal movement, no contractions vaginal bleeding or loss of fluid    Objective Data:            Vitals:    07/22/23 1209 07/22/23 2200 07/23/23 0730 07/23/23 1234   BP: 116/64 102/64 105/55 110/63   Pulse: 96 85 79 85   Resp: (!) 1 16 16 17   Temp: 36.1 °C (97 °F) 36.2 °C (97.2 °F) 36.2 °C (97.2 °F) 36.4 °C (97.5 °F)   TempSrc: Temporal Temporal Temporal Temporal   SpO2:   95% 95%   Weight:       Height:         NST x2 reviewed by me  Baby A: 130s, moderate variability with accelerations appropriate for gestational age  Baby B: 140s moderate variability with accelerations appropriate for gestational age    Current Facility-Administered Medications   Medication Dose Route Frequency Provider Last Rate Last Admin    aspirin (Asa) chewable tab 81 mg  81 mg Oral DAILY Trinidad Licona M.D.   81 mg at 07/23/23 0959    prenatal plus vitamin (Stuartnatal 1+1) 27-1 MG tablet 1 Tablet  1 Tablet Oral Daily-0800 Trinidad Licona M.D.   1 Tablet at 07/23/23 0959    folic acid (Folvite) tablet 1 mg  1 mg Oral DAILY Trinidad Licona M.D.   1 mg at 07/23/23 0959    lactated ringers infusion   Intravenous Continuous Bailey Ortiz M.D.   Stopped at 07/06/23 2130       Assessment: IUP twins at 28 weeks and 2 days  Mono/Di twins with selective fetal growth restriction twin B, abnormal umbilical artery Dopplers.  Bilateral absent end-diastolic flow  Betamethasone x2  Continue monitoring per perinatology    Plan: Continue inpatient management with monitoring 1 hour every 8 hours

## 2023-07-23 NOTE — PROGRESS NOTES
0700- Received report from Adry DALLAS.     0740- Pt on EFM for one hour monitoring. Assessment complete. Pt +FM. Denies nausea or pain. Pt denies LOF/ VB/ UC. POC discussed. No questions or concerns at this time. Call light and belongings within reach.     1530- Pt placed on EFM and TOCO for one hour monitoring. Pt has no complaints or questions at this time. Family at bedside. Call light within reach.     1630- Pt taken off the monitors. No questions or concerns at this time.     1900- Report given to CELENA Adan.

## 2023-07-23 NOTE — CARE PLAN
The patient is Watcher - Medium risk of patient condition declining or worsening    Shift Goals  Clinical Goals: Fetal Monitoring  Patient Goals: Healthy Pregnancy  Family Goals: Support    Progress made toward(s) clinical / shift goals:      Problem: Knowledge Deficit - L&D  Goal: Patient and family/caregivers will demonstrate understanding of plan of care, disease process/condition, diagnostic tests and medications  Outcome: Progressing     Problem: Psychosocial - L&D  Goal: Patient's level of anxiety will decrease  Outcome: Progressing       Patient is not progressing towards the following goals:

## 2023-07-23 NOTE — PROGRESS NOTES
0700-Report received from Angie DALLAS.  0800-Pt on EFM for one hour monitoring.  1600-Pt on EFM for one hour monitoring.  1900-Report to Angie DALLAS.

## 2023-07-24 ENCOUNTER — ANESTHESIA EVENT (OUTPATIENT)
Dept: OBGYN | Facility: MEDICAL CENTER | Age: 28
End: 2023-07-24
Payer: COMMERCIAL

## 2023-07-24 ENCOUNTER — ANESTHESIA (OUTPATIENT)
Dept: OBGYN | Facility: MEDICAL CENTER | Age: 28
End: 2023-07-24
Payer: COMMERCIAL

## 2023-07-24 LAB
BASOPHILS # BLD AUTO: 0.2 % (ref 0–1.8)
BASOPHILS # BLD AUTO: 0.5 % (ref 0–1.8)
BASOPHILS # BLD: 0.03 K/UL (ref 0–0.12)
BASOPHILS # BLD: 0.05 K/UL (ref 0–0.12)
EOSINOPHIL # BLD AUTO: 0 K/UL (ref 0–0.51)
EOSINOPHIL # BLD AUTO: 0.17 K/UL (ref 0–0.51)
EOSINOPHIL NFR BLD: 0 % (ref 0–6.9)
EOSINOPHIL NFR BLD: 1.8 % (ref 0–6.9)
ERYTHROCYTE [DISTWIDTH] IN BLOOD BY AUTOMATED COUNT: 39.4 FL (ref 35.9–50)
ERYTHROCYTE [DISTWIDTH] IN BLOOD BY AUTOMATED COUNT: 41.7 FL (ref 35.9–50)
ERYTHROCYTE [DISTWIDTH] IN BLOOD BY AUTOMATED COUNT: 43.5 FL (ref 35.9–50)
HCT VFR BLD AUTO: 33.8 % (ref 37–47)
HCT VFR BLD AUTO: 39.2 % (ref 37–47)
HCT VFR BLD AUTO: 40.2 % (ref 37–47)
HGB BLD-MCNC: 11.7 G/DL (ref 12–16)
HGB BLD-MCNC: 13 G/DL (ref 12–16)
HGB BLD-MCNC: 13 G/DL (ref 12–16)
IMM GRANULOCYTES # BLD AUTO: 0.07 K/UL (ref 0–0.11)
IMM GRANULOCYTES # BLD AUTO: 0.12 K/UL (ref 0–0.11)
IMM GRANULOCYTES NFR BLD AUTO: 0.7 % (ref 0–0.9)
IMM GRANULOCYTES NFR BLD AUTO: 0.7 % (ref 0–0.9)
LYMPHOCYTES # BLD AUTO: 1.09 K/UL (ref 1–4.8)
LYMPHOCYTES # BLD AUTO: 2.59 K/UL (ref 1–4.8)
LYMPHOCYTES NFR BLD: 27.3 % (ref 22–41)
LYMPHOCYTES NFR BLD: 6.4 % (ref 22–41)
MCH RBC QN AUTO: 30.2 PG (ref 27–33)
MCH RBC QN AUTO: 30.5 PG (ref 27–33)
MCH RBC QN AUTO: 30.5 PG (ref 27–33)
MCHC RBC AUTO-ENTMCNC: 32.3 G/DL (ref 32.2–35.5)
MCHC RBC AUTO-ENTMCNC: 33.2 G/DL (ref 32.2–35.5)
MCHC RBC AUTO-ENTMCNC: 34.6 G/DL (ref 32.2–35.5)
MCV RBC AUTO: 88.3 FL (ref 81.4–97.8)
MCV RBC AUTO: 91 FL (ref 81.4–97.8)
MCV RBC AUTO: 94.4 FL (ref 81.4–97.8)
MONOCYTES # BLD AUTO: 0.47 K/UL (ref 0–0.85)
MONOCYTES # BLD AUTO: 0.6 K/UL (ref 0–0.85)
MONOCYTES NFR BLD AUTO: 2.8 % (ref 0–13.4)
MONOCYTES NFR BLD AUTO: 6.3 % (ref 0–13.4)
NEUTROPHILS # BLD AUTO: 15.37 K/UL (ref 1.82–7.42)
NEUTROPHILS # BLD AUTO: 5.99 K/UL (ref 1.82–7.42)
NEUTROPHILS NFR BLD: 63.4 % (ref 44–72)
NEUTROPHILS NFR BLD: 89.9 % (ref 44–72)
NRBC # BLD AUTO: 0 K/UL
NRBC # BLD AUTO: 0 K/UL
NRBC BLD-RTO: 0 /100 WBC (ref 0–0.2)
NRBC BLD-RTO: 0 /100 WBC (ref 0–0.2)
PATHOLOGY CONSULT NOTE: NORMAL
PLATELET # BLD AUTO: 202 K/UL (ref 164–446)
PLATELET # BLD AUTO: 204 K/UL (ref 164–446)
PLATELET # BLD AUTO: 223 K/UL (ref 164–446)
PMV BLD AUTO: 10.1 FL (ref 9–12.9)
PMV BLD AUTO: 10.5 FL (ref 9–12.9)
PMV BLD AUTO: 10.8 FL (ref 9–12.9)
RBC # BLD AUTO: 3.83 M/UL (ref 4.2–5.4)
RBC # BLD AUTO: 4.26 M/UL (ref 4.2–5.4)
RBC # BLD AUTO: 4.31 M/UL (ref 4.2–5.4)
WBC # BLD AUTO: 10.5 K/UL (ref 4.8–10.8)
WBC # BLD AUTO: 17.1 K/UL (ref 4.8–10.8)
WBC # BLD AUTO: 9.5 K/UL (ref 4.8–10.8)

## 2023-07-24 PROCEDURE — 160048 HCHG OR STATISTICAL LEVEL 1-5: Performed by: OBSTETRICS & GYNECOLOGY

## 2023-07-24 PROCEDURE — 700105 HCHG RX REV CODE 258: Performed by: ANESTHESIOLOGY

## 2023-07-24 PROCEDURE — C1755 CATHETER, INTRASPINAL: HCPCS | Performed by: OBSTETRICS & GYNECOLOGY

## 2023-07-24 PROCEDURE — A9270 NON-COVERED ITEM OR SERVICE: HCPCS | Performed by: ANESTHESIOLOGY

## 2023-07-24 PROCEDURE — 770002 HCHG ROOM/CARE - OB PRIVATE (112)

## 2023-07-24 PROCEDURE — 700111 HCHG RX REV CODE 636 W/ 250 OVERRIDE (IP): Mod: JZ | Performed by: ANESTHESIOLOGY

## 2023-07-24 PROCEDURE — 99140 ANES COMP EMERGENCY COND: CPT | Performed by: ANESTHESIOLOGY

## 2023-07-24 PROCEDURE — 700111 HCHG RX REV CODE 636 W/ 250 OVERRIDE (IP)

## 2023-07-24 PROCEDURE — 700101 HCHG RX REV CODE 250: Performed by: ANESTHESIOLOGY

## 2023-07-24 PROCEDURE — 160035 HCHG PACU - 1ST 60 MINS PHASE I: Performed by: OBSTETRICS & GYNECOLOGY

## 2023-07-24 PROCEDURE — 85025 COMPLETE CBC W/AUTO DIFF WBC: CPT

## 2023-07-24 PROCEDURE — 88307 TISSUE EXAM BY PATHOLOGIST: CPT

## 2023-07-24 PROCEDURE — 700111 HCHG RX REV CODE 636 W/ 250 OVERRIDE (IP): Performed by: ANESTHESIOLOGY

## 2023-07-24 PROCEDURE — 160029 HCHG SURGERY MINUTES - 1ST 30 MINS LEVEL 4: Performed by: OBSTETRICS & GYNECOLOGY

## 2023-07-24 PROCEDURE — 700101 HCHG RX REV CODE 250

## 2023-07-24 PROCEDURE — 160002 HCHG RECOVERY MINUTES (STAT): Performed by: OBSTETRICS & GYNECOLOGY

## 2023-07-24 PROCEDURE — 160041 HCHG SURGERY MINUTES - EA ADDL 1 MIN LEVEL 4: Performed by: OBSTETRICS & GYNECOLOGY

## 2023-07-24 PROCEDURE — 36415 COLL VENOUS BLD VENIPUNCTURE: CPT

## 2023-07-24 PROCEDURE — 700102 HCHG RX REV CODE 250 W/ 637 OVERRIDE(OP): Performed by: ANESTHESIOLOGY

## 2023-07-24 PROCEDURE — 01961 ANES CESAREAN DELIVERY ONLY: CPT | Performed by: ANESTHESIOLOGY

## 2023-07-24 PROCEDURE — 160009 HCHG ANES TIME/MIN: Performed by: OBSTETRICS & GYNECOLOGY

## 2023-07-24 PROCEDURE — 85027 COMPLETE CBC AUTOMATED: CPT

## 2023-07-24 RX ORDER — ACETAMINOPHEN 500 MG
1000 TABLET ORAL EVERY 6 HOURS PRN
Status: DISCONTINUED | OUTPATIENT
Start: 2023-07-28 | End: 2023-07-28 | Stop reason: HOSPADM

## 2023-07-24 RX ORDER — ONDANSETRON 4 MG/1
4 TABLET, ORALLY DISINTEGRATING ORAL EVERY 6 HOURS PRN
Status: DISCONTINUED | OUTPATIENT
Start: 2023-07-25 | End: 2023-07-28 | Stop reason: HOSPADM

## 2023-07-24 RX ORDER — HYDROMORPHONE HYDROCHLORIDE 1 MG/ML
0.4 INJECTION, SOLUTION INTRAMUSCULAR; INTRAVENOUS; SUBCUTANEOUS
Status: DISCONTINUED | OUTPATIENT
Start: 2023-07-24 | End: 2023-07-24 | Stop reason: HOSPADM

## 2023-07-24 RX ORDER — KETOROLAC TROMETHAMINE 30 MG/ML
30 INJECTION, SOLUTION INTRAMUSCULAR; INTRAVENOUS EVERY 6 HOURS
Status: COMPLETED | OUTPATIENT
Start: 2023-07-24 | End: 2023-07-25

## 2023-07-24 RX ORDER — HYDROMORPHONE HYDROCHLORIDE 1 MG/ML
0.2 INJECTION, SOLUTION INTRAMUSCULAR; INTRAVENOUS; SUBCUTANEOUS
Status: DISCONTINUED | OUTPATIENT
Start: 2023-07-24 | End: 2023-07-24 | Stop reason: HOSPADM

## 2023-07-24 RX ORDER — MAGNESIUM SULFATE HEPTAHYDRATE 40 MG/ML
4 INJECTION, SOLUTION INTRAVENOUS ONCE
Status: COMPLETED | OUTPATIENT
Start: 2023-07-24 | End: 2023-07-24

## 2023-07-24 RX ORDER — DIPHENHYDRAMINE HYDROCHLORIDE 50 MG/ML
25 INJECTION INTRAMUSCULAR; INTRAVENOUS EVERY 6 HOURS PRN
Status: ACTIVE | OUTPATIENT
Start: 2023-07-24 | End: 2023-07-25

## 2023-07-24 RX ORDER — IBUPROFEN 800 MG/1
800 TABLET ORAL EVERY 8 HOURS PRN
Status: DISCONTINUED | OUTPATIENT
Start: 2023-07-28 | End: 2023-07-28 | Stop reason: HOSPADM

## 2023-07-24 RX ORDER — OXYCODONE HYDROCHLORIDE 5 MG/1
10 TABLET ORAL EVERY 4 HOURS PRN
Status: ACTIVE | OUTPATIENT
Start: 2023-07-24 | End: 2023-07-25

## 2023-07-24 RX ORDER — SODIUM CHLORIDE, SODIUM LACTATE, POTASSIUM CHLORIDE, CALCIUM CHLORIDE 600; 310; 30; 20 MG/100ML; MG/100ML; MG/100ML; MG/100ML
INJECTION, SOLUTION INTRAVENOUS CONTINUOUS
Status: DISCONTINUED | OUTPATIENT
Start: 2023-07-24 | End: 2023-07-24

## 2023-07-24 RX ORDER — SODIUM CHLORIDE, SODIUM GLUCONATE, SODIUM ACETATE, POTASSIUM CHLORIDE AND MAGNESIUM CHLORIDE 526; 502; 368; 37; 30 MG/100ML; MG/100ML; MG/100ML; MG/100ML; MG/100ML
1500 INJECTION, SOLUTION INTRAVENOUS ONCE
Status: COMPLETED | OUTPATIENT
Start: 2023-07-24 | End: 2023-07-24

## 2023-07-24 RX ORDER — OXYCODONE HYDROCHLORIDE 5 MG/1
5 TABLET ORAL EVERY 4 HOURS PRN
Status: DISCONTINUED | OUTPATIENT
Start: 2023-07-25 | End: 2023-07-28 | Stop reason: HOSPADM

## 2023-07-24 RX ORDER — SODIUM CHLORIDE, SODIUM GLUCONATE, SODIUM ACETATE, POTASSIUM CHLORIDE AND MAGNESIUM CHLORIDE 526; 502; 368; 37; 30 MG/100ML; MG/100ML; MG/100ML; MG/100ML; MG/100ML
INJECTION, SOLUTION INTRAVENOUS
Status: DISCONTINUED | OUTPATIENT
Start: 2023-07-24 | End: 2023-07-24 | Stop reason: SURG

## 2023-07-24 RX ORDER — SODIUM CHLORIDE, SODIUM LACTATE, POTASSIUM CHLORIDE, CALCIUM CHLORIDE 600; 310; 30; 20 MG/100ML; MG/100ML; MG/100ML; MG/100ML
INJECTION, SOLUTION INTRAVENOUS PRN
Status: DISCONTINUED | OUTPATIENT
Start: 2023-07-24 | End: 2023-07-28 | Stop reason: HOSPADM

## 2023-07-24 RX ORDER — CALCIUM CARBONATE 500 MG/1
1000 TABLET, CHEWABLE ORAL EVERY 6 HOURS PRN
Status: DISCONTINUED | OUTPATIENT
Start: 2023-07-24 | End: 2023-07-28 | Stop reason: HOSPADM

## 2023-07-24 RX ORDER — OXYCODONE HYDROCHLORIDE 5 MG/1
10 TABLET ORAL EVERY 4 HOURS PRN
Status: DISCONTINUED | OUTPATIENT
Start: 2023-07-25 | End: 2023-07-28 | Stop reason: HOSPADM

## 2023-07-24 RX ORDER — ONDANSETRON 2 MG/ML
4 INJECTION INTRAMUSCULAR; INTRAVENOUS EVERY 6 HOURS PRN
Status: DISCONTINUED | OUTPATIENT
Start: 2023-07-25 | End: 2023-07-28 | Stop reason: HOSPADM

## 2023-07-24 RX ORDER — IBUPROFEN 800 MG/1
800 TABLET ORAL EVERY 8 HOURS
Status: COMPLETED | OUTPATIENT
Start: 2023-07-25 | End: 2023-07-28

## 2023-07-24 RX ORDER — DIPHENHYDRAMINE HYDROCHLORIDE 50 MG/ML
25 INJECTION INTRAMUSCULAR; INTRAVENOUS EVERY 6 HOURS PRN
Status: DISCONTINUED | OUTPATIENT
Start: 2023-07-25 | End: 2023-07-28 | Stop reason: HOSPADM

## 2023-07-24 RX ORDER — BUPIVACAINE HYDROCHLORIDE 7.5 MG/ML
INJECTION, SOLUTION INTRASPINAL
Status: COMPLETED | OUTPATIENT
Start: 2023-07-24 | End: 2023-07-24

## 2023-07-24 RX ORDER — HYDROMORPHONE HYDROCHLORIDE 1 MG/ML
0.1 INJECTION, SOLUTION INTRAMUSCULAR; INTRAVENOUS; SUBCUTANEOUS
Status: DISCONTINUED | OUTPATIENT
Start: 2023-07-24 | End: 2023-07-24 | Stop reason: HOSPADM

## 2023-07-24 RX ORDER — BISACODYL 10 MG
10 SUPPOSITORY, RECTAL RECTAL PRN
Status: DISCONTINUED | OUTPATIENT
Start: 2023-07-24 | End: 2023-07-28 | Stop reason: HOSPADM

## 2023-07-24 RX ORDER — EPHEDRINE SULFATE 50 MG/ML
INJECTION, SOLUTION INTRAVENOUS
Status: COMPLETED
Start: 2023-07-24 | End: 2023-07-24

## 2023-07-24 RX ORDER — ACETAMINOPHEN 500 MG
1000 TABLET ORAL EVERY 6 HOURS
Status: DISPENSED | OUTPATIENT
Start: 2023-07-24 | End: 2023-07-25

## 2023-07-24 RX ORDER — PHENYLEPHRINE HCL IN 0.9% NACL 0.5 MG/5ML
SYRINGE (ML) INTRAVENOUS PRN
Status: DISCONTINUED | OUTPATIENT
Start: 2023-07-24 | End: 2023-07-24 | Stop reason: SURG

## 2023-07-24 RX ORDER — LABETALOL HYDROCHLORIDE 5 MG/ML
5 INJECTION, SOLUTION INTRAVENOUS
Status: DISCONTINUED | OUTPATIENT
Start: 2023-07-24 | End: 2023-07-24 | Stop reason: HOSPADM

## 2023-07-24 RX ORDER — ONDANSETRON 2 MG/ML
4 INJECTION INTRAMUSCULAR; INTRAVENOUS EVERY 6 HOURS PRN
Status: DISPENSED | OUTPATIENT
Start: 2023-07-24 | End: 2023-07-25

## 2023-07-24 RX ORDER — VITAMIN A ACETATE, BETA CAROTENE, ASCORBIC ACID, CHOLECALCIFEROL, .ALPHA.-TOCOPHEROL ACETATE, DL-, THIAMINE MONONITRATE, RIBOFLAVIN, NIACINAMIDE, PYRIDOXINE HYDROCHLORIDE, FOLIC ACID, CYANOCOBALAMIN, CALCIUM CARBONATE, FERROUS FUMARATE, ZINC OXIDE, CUPRIC OXIDE 3080; 12; 120; 400; 1; 1.84; 3; 20; 22; 920; 25; 200; 27; 10; 2 [IU]/1; UG/1; MG/1; [IU]/1; MG/1; MG/1; MG/1; MG/1; MG/1; [IU]/1; MG/1; MG/1; MG/1; MG/1; MG/1
1 TABLET, FILM COATED ORAL
Status: DISCONTINUED | OUTPATIENT
Start: 2023-07-24 | End: 2023-07-28 | Stop reason: HOSPADM

## 2023-07-24 RX ORDER — MAGNESIUM SULFATE HEPTAHYDRATE 40 MG/ML
4 INJECTION, SOLUTION INTRAVENOUS ONCE
Status: DISCONTINUED | OUTPATIENT
Start: 2023-07-24 | End: 2023-07-24

## 2023-07-24 RX ORDER — ONDANSETRON 2 MG/ML
4 INJECTION INTRAMUSCULAR; INTRAVENOUS
Status: DISCONTINUED | OUTPATIENT
Start: 2023-07-24 | End: 2023-07-24 | Stop reason: HOSPADM

## 2023-07-24 RX ORDER — MORPHINE SULFATE 0.5 MG/ML
INJECTION, SOLUTION EPIDURAL; INTRATHECAL; INTRAVENOUS
Status: COMPLETED | OUTPATIENT
Start: 2023-07-24 | End: 2023-07-24

## 2023-07-24 RX ORDER — EPHEDRINE SULFATE 50 MG/ML
10 INJECTION, SOLUTION INTRAVENOUS
Status: ACTIVE | OUTPATIENT
Start: 2023-07-24 | End: 2023-07-25

## 2023-07-24 RX ORDER — IPRATROPIUM BROMIDE AND ALBUTEROL SULFATE 2.5; .5 MG/3ML; MG/3ML
3 SOLUTION RESPIRATORY (INHALATION)
Status: DISCONTINUED | OUTPATIENT
Start: 2023-07-24 | End: 2023-07-24 | Stop reason: HOSPADM

## 2023-07-24 RX ORDER — DIPHENHYDRAMINE HCL 25 MG
25 TABLET ORAL EVERY 6 HOURS PRN
Status: DISCONTINUED | OUTPATIENT
Start: 2023-07-25 | End: 2023-07-28 | Stop reason: HOSPADM

## 2023-07-24 RX ORDER — OXYCODONE HCL 5 MG/5 ML
10 SOLUTION, ORAL ORAL
Status: DISCONTINUED | OUTPATIENT
Start: 2023-07-24 | End: 2023-07-24 | Stop reason: HOSPADM

## 2023-07-24 RX ORDER — HYDRALAZINE HYDROCHLORIDE 20 MG/ML
5 INJECTION INTRAMUSCULAR; INTRAVENOUS
Status: DISCONTINUED | OUTPATIENT
Start: 2023-07-24 | End: 2023-07-24 | Stop reason: HOSPADM

## 2023-07-24 RX ORDER — MAGNESIUM SULFATE HEPTAHYDRATE 40 MG/ML
INJECTION, SOLUTION INTRAVENOUS
Status: COMPLETED
Start: 2023-07-24 | End: 2023-07-24

## 2023-07-24 RX ORDER — DIPHENHYDRAMINE HYDROCHLORIDE 50 MG/ML
12.5 INJECTION INTRAMUSCULAR; INTRAVENOUS EVERY 6 HOURS PRN
Status: ACTIVE | OUTPATIENT
Start: 2023-07-24 | End: 2023-07-25

## 2023-07-24 RX ORDER — MIDAZOLAM HYDROCHLORIDE 1 MG/ML
1 INJECTION INTRAMUSCULAR; INTRAVENOUS
Status: DISCONTINUED | OUTPATIENT
Start: 2023-07-24 | End: 2023-07-24 | Stop reason: HOSPADM

## 2023-07-24 RX ORDER — DEXAMETHASONE SODIUM PHOSPHATE 4 MG/ML
INJECTION, SOLUTION INTRA-ARTICULAR; INTRALESIONAL; INTRAMUSCULAR; INTRAVENOUS; SOFT TISSUE PRN
Status: DISCONTINUED | OUTPATIENT
Start: 2023-07-24 | End: 2023-07-24 | Stop reason: SURG

## 2023-07-24 RX ORDER — CEFAZOLIN SODIUM 1 G/3ML
INJECTION, POWDER, FOR SOLUTION INTRAMUSCULAR; INTRAVENOUS PRN
Status: DISCONTINUED | OUTPATIENT
Start: 2023-07-24 | End: 2023-07-24 | Stop reason: SURG

## 2023-07-24 RX ORDER — DOCUSATE SODIUM 100 MG/1
100 CAPSULE, LIQUID FILLED ORAL 2 TIMES DAILY PRN
Status: DISCONTINUED | OUTPATIENT
Start: 2023-07-24 | End: 2023-07-28 | Stop reason: HOSPADM

## 2023-07-24 RX ORDER — ACETAMINOPHEN 500 MG
1000 TABLET ORAL EVERY 6 HOURS
Status: COMPLETED | OUTPATIENT
Start: 2023-07-25 | End: 2023-07-28

## 2023-07-24 RX ORDER — MEPERIDINE HYDROCHLORIDE 25 MG/ML
12.5 INJECTION INTRAMUSCULAR; INTRAVENOUS; SUBCUTANEOUS
Status: DISCONTINUED | OUTPATIENT
Start: 2023-07-24 | End: 2023-07-24 | Stop reason: HOSPADM

## 2023-07-24 RX ORDER — SIMETHICONE 125 MG
125 TABLET,CHEWABLE ORAL 4 TIMES DAILY PRN
Status: DISCONTINUED | OUTPATIENT
Start: 2023-07-24 | End: 2023-07-28 | Stop reason: HOSPADM

## 2023-07-24 RX ORDER — HYDROMORPHONE HYDROCHLORIDE 1 MG/ML
0.2 INJECTION, SOLUTION INTRAMUSCULAR; INTRAVENOUS; SUBCUTANEOUS
Status: ACTIVE | OUTPATIENT
Start: 2023-07-24 | End: 2023-07-25

## 2023-07-24 RX ORDER — METOCLOPRAMIDE HYDROCHLORIDE 5 MG/ML
10 INJECTION INTRAMUSCULAR; INTRAVENOUS ONCE
Status: COMPLETED | OUTPATIENT
Start: 2023-07-24 | End: 2023-07-24

## 2023-07-24 RX ORDER — OXYCODONE HCL 5 MG/5 ML
5 SOLUTION, ORAL ORAL
Status: DISCONTINUED | OUTPATIENT
Start: 2023-07-24 | End: 2023-07-24 | Stop reason: HOSPADM

## 2023-07-24 RX ORDER — CITRIC ACID/SODIUM CITRATE 334-500MG
30 SOLUTION, ORAL ORAL ONCE
Status: COMPLETED | OUTPATIENT
Start: 2023-07-24 | End: 2023-07-24

## 2023-07-24 RX ORDER — EPHEDRINE SULFATE 50 MG/ML
5 INJECTION, SOLUTION INTRAVENOUS ONCE
Status: COMPLETED | OUTPATIENT
Start: 2023-07-24 | End: 2023-07-24

## 2023-07-24 RX ORDER — ONDANSETRON 2 MG/ML
INJECTION INTRAMUSCULAR; INTRAVENOUS PRN
Status: DISCONTINUED | OUTPATIENT
Start: 2023-07-24 | End: 2023-07-24 | Stop reason: SURG

## 2023-07-24 RX ORDER — OXYCODONE HYDROCHLORIDE 5 MG/1
5 TABLET ORAL EVERY 4 HOURS PRN
Status: ACTIVE | OUTPATIENT
Start: 2023-07-24 | End: 2023-07-25

## 2023-07-24 RX ORDER — KETOROLAC TROMETHAMINE 30 MG/ML
INJECTION, SOLUTION INTRAMUSCULAR; INTRAVENOUS PRN
Status: DISCONTINUED | OUTPATIENT
Start: 2023-07-24 | End: 2023-07-24 | Stop reason: SURG

## 2023-07-24 RX ORDER — HALOPERIDOL 5 MG/ML
1 INJECTION INTRAMUSCULAR
Status: DISCONTINUED | OUTPATIENT
Start: 2023-07-24 | End: 2023-07-24 | Stop reason: HOSPADM

## 2023-07-24 RX ORDER — OXYTOCIN 10 [USP'U]/ML
INJECTION, SOLUTION INTRAMUSCULAR; INTRAVENOUS PRN
Status: DISCONTINUED | OUTPATIENT
Start: 2023-07-24 | End: 2023-07-24 | Stop reason: SURG

## 2023-07-24 RX ADMIN — ONDANSETRON 4 MG: 2 INJECTION INTRAMUSCULAR; INTRAVENOUS at 13:08

## 2023-07-24 RX ADMIN — EPHEDRINE SULFATE 5 MG: 50 INJECTION, SOLUTION INTRAVENOUS at 03:00

## 2023-07-24 RX ADMIN — DEXAMETHASONE SODIUM PHOSPHATE 8 MG: 4 INJECTION INTRA-ARTICULAR; INTRALESIONAL; INTRAMUSCULAR; INTRAVENOUS; SOFT TISSUE at 01:28

## 2023-07-24 RX ADMIN — FAMOTIDINE 20 MG: 10 INJECTION, SOLUTION INTRAVENOUS at 01:08

## 2023-07-24 RX ADMIN — OXYTOCIN 20 UNITS: 10 INJECTION, SOLUTION INTRAMUSCULAR; INTRAVENOUS at 01:44

## 2023-07-24 RX ADMIN — METOCLOPRAMIDE HYDROCHLORIDE 10 MG: 5 INJECTION INTRAMUSCULAR; INTRAVENOUS at 01:09

## 2023-07-24 RX ADMIN — SODIUM CITRATE AND CITRIC ACID MONOHYDRATE 30 ML: 500; 334 SOLUTION ORAL at 01:09

## 2023-07-24 RX ADMIN — Medication 100 MCG: at 01:31

## 2023-07-24 RX ADMIN — SODIUM CHLORIDE, SODIUM GLUCONATE, SODIUM ACETATE, POTASSIUM CHLORIDE AND MAGNESIUM CHLORIDE: 526; 502; 368; 37; 30 INJECTION, SOLUTION INTRAVENOUS at 01:20

## 2023-07-24 RX ADMIN — BUPIVACAINE HYDROCHLORIDE IN DEXTROSE 1.5 ML: 7.5 INJECTION, SOLUTION SUBARACHNOID at 01:27

## 2023-07-24 RX ADMIN — MAGNESIUM SULFATE 4 G: 4 INJECTION INTRAVENOUS at 00:52

## 2023-07-24 RX ADMIN — SODIUM CHLORIDE, SODIUM GLUCONATE, SODIUM ACETATE, POTASSIUM CHLORIDE AND MAGNESIUM CHLORIDE 1000 ML: 526; 502; 368; 37; 30 INJECTION, SOLUTION INTRAVENOUS at 00:59

## 2023-07-24 RX ADMIN — KETOROLAC TROMETHAMINE 30 MG: 30 INJECTION, SOLUTION INTRAMUSCULAR; INTRAVENOUS at 21:45

## 2023-07-24 RX ADMIN — Medication 100 MCG: at 01:28

## 2023-07-24 RX ADMIN — MORPHINE SULFATE 150 MCG: 0.5 INJECTION, SOLUTION EPIDURAL; INTRATHECAL; INTRAVENOUS at 01:27

## 2023-07-24 RX ADMIN — KETOROLAC TROMETHAMINE 30 MG: 30 INJECTION, SOLUTION INTRAMUSCULAR; INTRAVENOUS at 08:54

## 2023-07-24 RX ADMIN — MAGNESIUM SULFATE HEPTAHYDRATE 4 G: 40 INJECTION, SOLUTION INTRAVENOUS at 00:52

## 2023-07-24 RX ADMIN — Medication 100 MCG: at 01:44

## 2023-07-24 RX ADMIN — ONDANSETRON 4 MG: 2 INJECTION INTRAMUSCULAR; INTRAVENOUS at 01:28

## 2023-07-24 RX ADMIN — OXYTOCIN 10 UNITS: 10 INJECTION, SOLUTION INTRAMUSCULAR; INTRAVENOUS at 02:21

## 2023-07-24 RX ADMIN — ACETAMINOPHEN 1000 MG: 500 TABLET, FILM COATED ORAL at 17:16

## 2023-07-24 RX ADMIN — KETOROLAC TROMETHAMINE 30 MG: 30 INJECTION, SOLUTION INTRAMUSCULAR; INTRAVENOUS at 01:54

## 2023-07-24 RX ADMIN — CEFAZOLIN 2 G: 1 INJECTION, POWDER, FOR SOLUTION INTRAMUSCULAR; INTRAVENOUS at 01:28

## 2023-07-24 RX ADMIN — KETOROLAC TROMETHAMINE 30 MG: 30 INJECTION, SOLUTION INTRAMUSCULAR; INTRAVENOUS at 15:47

## 2023-07-24 ASSESSMENT — PAIN DESCRIPTION - PAIN TYPE
TYPE: SURGICAL PAIN
TYPE: ACUTE PAIN;SURGICAL PAIN
TYPE: SURGICAL PAIN
TYPE: ACUTE PAIN;SURGICAL PAIN
TYPE: SURGICAL PAIN
TYPE: ACUTE PAIN;SURGICAL PAIN
TYPE: SURGICAL PAIN
TYPE: SURGICAL PAIN

## 2023-07-24 ASSESSMENT — PAIN SCALES - GENERAL: PAIN_LEVEL: 0

## 2023-07-24 NOTE — ANESTHESIA POSTPROCEDURE EVALUATION
Patient: Vicky Garza    Procedure Summary     Date: 23 Room / Location: [facility] OR 01 / SURGERY LABOR AND DELIVERY    Anesthesia Start: 120 Anesthesia Stop: 211    Procedure:  SECTION, PRIMARY (Abdomen) Diagnosis: (Baby B non reassuring heart tones)    Surgeons: Claudia Alexander M.D. Responsible Provider: Cristóbal Kuo M.D.    Anesthesia Type: spinal ASA Status: 2 - Emergent          Final Anesthesia Type: spinal  Last vitals  BP   Blood Pressure: 109/65    Temp   36.9 °C (98.5 °F)    Pulse   93   Resp   16    SpO2   95 %      Anesthesia Post Evaluation    Patient location during evaluation: PACU  Patient participation: complete - patient participated  Level of consciousness: awake and alert  Pain score: 0    Airway patency: patent  Anesthetic complications: no  Cardiovascular status: hemodynamically stable  Respiratory status: acceptable  Hydration status: euvolemic    PONV: none          No notable events documented.     Nurse Pain Score: 0 (NPRS)

## 2023-07-24 NOTE — PROGRESS NOTES
0110: Report received from Yas DALLAS. Care assumed. POC discussed with pt and family. All questions answered.  0117: Pt transferred from  to OR 1 via gruney with side rails up and family & RNs at side.   0120: Pt in OR 1. Pt transferred from bed to OR table via self.  0140: Delivery of viable male fetus A, APGARs 6/8.  0141: Delivery of viable male fetus B, APGARs 5/8.  0207: Pt transferred from OR 1 to PACU 3 in stable condition.  0254: Dr Kuo called regarding low BP (see flowsheets). Orders received for 5mg ephedrine.   0350: Pt transferred to PP unit in stable condition to room 338 via [place] with RN and family at side.   0415: Report given to Corie DALLAS, care relinquished.

## 2023-07-24 NOTE — OP REPORT
Operative procedure note    Procedure performed  1. Primary         Preoperative diagnosis  1. IUP twins, mono/di  2. 28 3/7 weeks  3. Selective FGR Baby B with AEDF  4. Non reassuring fetal monitoring baby B    Postoperative diagnosis  Same    Surgeon: Claudia Alexander M.D.  Assistant: TAPAN Malave MD    Anesthesiologist: Ayaan  Anesthesia: spinal    Estimated blood loss: 600 cc    Baby A male presentation vertex APGAR pending/pending  Baby B male presentation footling breech APGAR 6/8  Uterus Normal, Tubes Normal, ovaries Normal       Procedure in detail  After informed consent had been obtained the patient was seen to the operating room where anesthesia was found to be adequate.  The present was prepped and draped in the usual sterile fashion, a timeout was performed.  Pfannenstiel skin incision was made and carried down to the underlying layer of fascia with the knife.  The fascia was then nicked in the midline elevated and the fascial incision extended laterally on both sides with Craft scissors.  The rectus muscles were then dissected off the posterior aspect of the fascia both superiorly and inferiorly.  They were  in the midline the peritoneum was identified and entered sharply with Metzenbaum scissors.  The peritoneal incision was then extended superiorly and inferiorly with good visualization of bowel and bladder.  The Jose O retractor was placed into the incision and checked to make sure there was no bowel entrapment.  The uterus was incised in a low transverse fashion and extended with bandage scissors.  Baby A  was delivered through the incision without complication, after 30 seconds of delayed cord clamping the cord was clamped x2 and cut. Baby A was handed to NICU staff.  Baby B was delivered in footling breech presentation without difficulty.  After 30 seconds the cord was clamped X2 and cut. The infant was then handed to awaiting NICU staff.  The placenta then delivered  spontaneously.  The uterus was cleared of all clots and debris with a moist lap sponge.  The uterine incision closed with a #1 chromic in a running locked fashion.   Hemostasis was found to be adequate at the hysterotomy.  The abdomen was irrigated cleared of all clots and debris with a moist lap sponge.  The Jose O retractor was removed.  The peritoneum was identified and loosely closed with 0 Vicryl.  The muscles were reapproximated with the same Vicryl stitch.  The fascia was then closed with 0 Vicryl in a running fashion.  Subcutaneous layers were checked for hemostasis which was noted to be adequate reapproximated with a 2-0 plain.  The skin was closed in a subcuticular fashion with a 4-0 Vicryl.  Sponge lap needle counts were all correct x2 and the patient is taken to the recovery room in good condition.

## 2023-07-24 NOTE — CARE PLAN
The patient is Stable - Low risk of patient condition declining or worsening    Shift Goals  Clinical Goals: Vital signs WDL, Pain control, Rest  Patient Goals: healthy babies  Family Goals: support      Problem: Knowledge Deficit - Postpartum  Goal: Patient will verbalize and demonstrate understanding of self and infant care  Outcome: Progressing  Note: Patient demonstrated knowledge of self care.     Problem: Early Mobilization - Post Surgery  Goal: Early mobilization post surgery  Outcome: Progressing  Note: Patient ambulated to the bathroom with stable gait. Patient also went to NICU via wheelchair.

## 2023-07-24 NOTE — PROGRESS NOTES
0854- Assessment completed. Fundus firm, lochia light. Abd. incisions with mepilix silver dressing intact. Plan of care reviewed. Denies pain at this time, will call if pain med intervention needed. Call light within reach, bed at lowest position, and shows no signs of distress at this time.     1200- Patient ambulated to the restroom, steady gait, and tolerated well.    1730- Alfaro removed and catheter intact.

## 2023-07-24 NOTE — LACTATION NOTE
Initial Lactation Consultation:    Met with Vicky to initiate pumping. Pumping was offered to patient earlier in the day by RN, but patient was feeling too unwell, and requested to delay pump start.    Vicky reports that she breast fed her older two children until 6 months, but required supplementary formula due to low supply. We discussed the importance of regular breast stimulation in the development of a full milk supply.    Pump setup completed, and instructions discussed with patient. Handouts for maximizing milk supply, pump part cleaning, pump rental, and milk storage reviewed and left at bedside.    Patient fitted with 22.5mm flanges.  The following settings were used during pump session: Cycle speed of 80 CPM times 2 minutes, then 60 CPM for additional 13 minutes. Vicky reports comfort pumping at 28% suction. Vicky is educated on hand expression, and encouraged to watch [email or web address] hand expression video for reinforcement of hand expression technique.     She is reassured that pumping/hand expression is intended as breast stimulation only in the first few days post delivery. It is common to be unable to express colostrum at this time.     Pumping Plan:    Pump breasts with hospital grade double electric pump every 3 hours, for a total of 8+ pump sessions per 24 hours. Hand express for 2-3 minutes following each pump session.    Vicky is encouraged to call for RN/Lactation assistance with any developing pumping needs; she is also made aware that in-patient lactation services will be available to her for the duration of her boys' stay in NICU.

## 2023-07-24 NOTE — ANESTHESIA PREPROCEDURE EVALUATION
Case: [id number] Date/Time: 23 0100    Procedure:  SECTION, PRIMARY (Abdomen)    Anesthesia type: Spinal    Pre-op diagnosis: Baby B non reassuring heart tones    Location: [facility] OR  / SURGERY LABOR AND DELIVERY    Surgeons: Claudia Alexander M.D.       with twins, non-reassuring heart tones with baby B.     28 weeks.     Healthy otherwise.     Relevant Problems   Other   (positive) Monochorionic diamniotic twin pregnancy in second trimester       Physical Exam    Airway   Mallampati: II  TM distance: >3 FB  Neck ROM: full       Cardiovascular - normal exam  Rhythm: regular  Rate: normal  (-) murmur     Dental - normal exam           Pulmonary - normal exam  Breath sounds clear to auscultation     Abdominal    Neurological - normal exam                 Anesthesia Plan    ASA 2- EMERGENT   ASA physical status emergent criteria: non-reassuring fetal heart tones    Plan - spinal   Neuraxial block will be primary anesthetic                Postoperative Plan: Postoperative administration of opioids is intended.    Pertinent diagnostic labs and testing reviewed    Informed Consent:    Anesthetic plan and risks discussed with patient.

## 2023-07-24 NOTE — PROGRESS NOTES
Updated history and physical/progress note    Subjective: 27-year-old G3, P2 at 28 weeks and 3 days gestation.  Patient is admitted for fetal growth restriction, selective in twin B of a monochorionic diamniotic pregnancy      Objective Data:            r  Vitals:    07/22/23 2200 07/23/23 0730 07/23/23 1234 07/23/23 1635   BP: 102/64 105/55 110/63 109/65   Pulse: 85 79 85 93   Resp: 16 16 17 16   Temp: 36.2 °C (97.2 °F) 36.2 °C (97.2 °F) 36.4 °C (97.5 °F) 36.9 °C (98.5 °F)   TempSrc: Temporal Temporal Temporal Temporal   SpO2:  95% 95% 95%   Weight:       Height:           Well-developed well-nourished female no apparent distress    Fetal heart tones read by me  Baby A 130s with accelerations no decelerations, appropriate for gestational age  Baby B 140s, with accelerations, 3 decelerations, 1 apparently prolonged 2 to 3 minutes, 2 others much milder variables  [place] no contractions      Current Facility-Administered Medications   Medication Dose Route Frequency Provider Last Rate Last Admin    MAGNESIUM SULFATE 4 GM/100ML IV SOLN             electrolyte-A (Plasmalyte-A) infusion 1,500 mL  1,500 mL Intravenous Once Cristóbal Kuo M.D.        Na citrate-citric acid (Bicitra) 500-334 MG/5ML solution 30 mL  30 mL Oral Once Cristóbal Kuo M.D.        famotidine (Pepcid) injection 20 mg  20 mg Intravenous Once Cristóbal Kuo M.D.        metoclopramide (Reglan) injection 10 mg  10 mg Intravenous Once Cristóbal Kuo M.D.        aspirin (Asa) chewable tab 81 mg  81 mg Oral DAILY Trinidad Licona M.D.   81 mg at 07/23/23 0959    prenatal plus vitamin (Stuartnatal 1+1) 27-1 MG tablet 1 Tablet  1 Tablet Oral Daily-0800 Trinidad Licona M.D.   1 Tablet at 07/23/23 0959    folic acid (Folvite) tablet 1 mg  1 mg Oral DAILY Trinidad Licona M.D.   1 mg at 07/23/23 0959    lactated ringers infusion   Intravenous Continuous Bailey Ortiz M.D.   Stopped at 07/06/23 1887       Assessment: IUP twins at 28 weeks 3 days  monochorionic diamniotic selective fetal growth restriction baby B with absent end-diastolic flow, baby A with elevated Dopplers  Patient has received betamethasone x2    Plan: Reviewed fetal heart rate tracing with perinatology who agrees to proceed with primary  delivery at this time  Will restart magnesium bolus for neuro protection  Proceed with primary  delivery  Discussed with the patient indications for  delivery. The patient voiced understanding of indications for  section at this time.    Discussed with the patient the risks of  delivery. The risks include infection, bleeding, scarring, damage to other organs in the area of operation. Specifically organs that can be damaged are bowel, bladder, ureters. I also discussed with the patient the risk of wound infection and wound breakdown. We discussed that these risks are greater in people that have diabetes or obesity. I also discussed the risk of emergency blood transfusion during procedure as well as emergency hysterectomy during procedure.      Patient had the opportunity to ask questions regarding procedures. All questions answered to the patient's satisfaction.  Proceed with  delivery

## 2023-07-24 NOTE — CARE PLAN
The patient is Stable - Low risk of patient condition declining or worsening    Shift Goals  Clinical Goals: maintain vitals  Patient Goals: healthy babies  Family Goals: support    Progress made toward(s) clinical / shift goals:      Problem: Altered Physiologic Condition  Goal: Patient physiologically stable as evidenced by normal lochia, palpable uterine involution and vitals within normal limits  Outcome: Progressing  Note: Fundus firm, lochia light to scant, ambulating.      Problem: Infection - Postpartum  Goal: Postpartum patient will be free of signs and symptoms of infection  Outcome: Progressing  Note: Patient remains free from signs and symptoms of infection, vital signs stable.

## 2023-07-24 NOTE — ANESTHESIA PROCEDURE NOTES
Spinal Block    Date/Time: 7/24/2023 1:27 AM    Performed by: Cristóbal Kuo M.D.  Authorized by: Cristóbal Kuo M.D.    Start Time:  7/24/2023 1:27 AM  End Time:  7/24/2023 1:29 AM  Reason for Block: primary anesthetic    patient identified, IV checked, site marked, risks and benefits discussed, surgical consent, monitors and equipment checked, pre-op evaluation and timeout performed    Patient Position:  Sitting  Prep: ChloraPrep, patient draped and sterile technique    Monitoring:  Blood pressure, continuous pulse oximetry and heart rate  Approach:  Midline  Location:  L4-5  Injection Technique:  Single-shot  Skin infiltration:  Lidocaine  Strength:  1%  Dose:  3ml  Needle Type:  Pencan  Needle Gauge:  25 G  CSF flowing pre/post injection:  Yes  Sensory Level:  T6

## 2023-07-24 NOTE — ANESTHESIA TIME REPORT
Anesthesia Start and Stop Event Times     Date Time Event    7/24/2023 0101 Ready for Procedure     0120 Anesthesia Start     0211 Anesthesia Stop        Responsible Staff  07/24/23    Name Role Begin End    Cristóbal Kuo M.D. Anesth 0120 0211        Overtime Reason:  no overtime (within assigned shift)    Comments:

## 2023-07-24 NOTE — PROGRESS NOTES
0700 Report received from CELENA Lanier. POC discussed, pt going off unit with family.     0002 MD Benjamin notified about variable decels and baseline change, asked to review strip.    0023 MD Benjamin at bedside to discuss POC with pt, will continue to monitor FHT.    0040 Call for primary  section by MD Benjamin.     0110 Report given to CELENA Prado. Care relinquished.

## 2023-07-25 PROCEDURE — 700102 HCHG RX REV CODE 250 W/ 637 OVERRIDE(OP): Performed by: OBSTETRICS & GYNECOLOGY

## 2023-07-25 PROCEDURE — 700102 HCHG RX REV CODE 250 W/ 637 OVERRIDE(OP): Performed by: ANESTHESIOLOGY

## 2023-07-25 PROCEDURE — 770002 HCHG ROOM/CARE - OB PRIVATE (112)

## 2023-07-25 PROCEDURE — 700111 HCHG RX REV CODE 636 W/ 250 OVERRIDE (IP): Mod: JZ | Performed by: ANESTHESIOLOGY

## 2023-07-25 PROCEDURE — A9270 NON-COVERED ITEM OR SERVICE: HCPCS | Performed by: ANESTHESIOLOGY

## 2023-07-25 PROCEDURE — 700111 HCHG RX REV CODE 636 W/ 250 OVERRIDE (IP): Performed by: OBSTETRICS & GYNECOLOGY

## 2023-07-25 PROCEDURE — A9270 NON-COVERED ITEM OR SERVICE: HCPCS | Performed by: OBSTETRICS & GYNECOLOGY

## 2023-07-25 RX ADMIN — IBUPROFEN 800 MG: 800 TABLET, FILM COATED ORAL at 09:06

## 2023-07-25 RX ADMIN — KETOROLAC TROMETHAMINE 30 MG: 30 INJECTION, SOLUTION INTRAMUSCULAR; INTRAVENOUS at 03:13

## 2023-07-25 RX ADMIN — ACETAMINOPHEN 1000 MG: 500 TABLET, FILM COATED ORAL at 23:59

## 2023-07-25 RX ADMIN — DOCUSATE SODIUM 100 MG: 100 CAPSULE, LIQUID FILLED ORAL at 06:05

## 2023-07-25 RX ADMIN — IBUPROFEN 800 MG: 800 TABLET, FILM COATED ORAL at 17:08

## 2023-07-25 RX ADMIN — PRENATAL WITH FERROUS FUM AND FOLIC ACID 1 TABLET: 3080; 920; 120; 400; 22; 1.84; 3; 20; 10; 1; 12; 200; 27; 25; 2 TABLET ORAL at 09:05

## 2023-07-25 RX ADMIN — ACETAMINOPHEN 1000 MG: 500 TABLET, FILM COATED ORAL at 00:26

## 2023-07-25 RX ADMIN — ACETAMINOPHEN 1000 MG: 500 TABLET, FILM COATED ORAL at 12:13

## 2023-07-25 RX ADMIN — ONDANSETRON 4 MG: 4 TABLET, ORALLY DISINTEGRATING ORAL at 13:48

## 2023-07-25 RX ADMIN — ACETAMINOPHEN 1000 MG: 500 TABLET, FILM COATED ORAL at 06:05

## 2023-07-25 RX ADMIN — OXYCODONE HYDROCHLORIDE 10 MG: 5 TABLET ORAL at 13:48

## 2023-07-25 RX ADMIN — ACETAMINOPHEN 1000 MG: 500 TABLET, FILM COATED ORAL at 18:12

## 2023-07-25 ASSESSMENT — PAIN DESCRIPTION - PAIN TYPE
TYPE: ACUTE PAIN;SURGICAL PAIN
TYPE: ACUTE PAIN
TYPE: ACUTE PAIN
TYPE: ACUTE PAIN;SURGICAL PAIN

## 2023-07-25 NOTE — PROGRESS NOTES
POD#1  S) pt without c/o, urinating, tolerating regular diet  O) vss  Inc: c/d/i, mepilex in place  Ext: no edema  Hgb: 11.7  A/P POD#1, s/p primary ltcs for nrfhrt ( twins 28wks )   - stable, continue all orders

## 2023-07-25 NOTE — PROGRESS NOTES
0900: Patient assessed and medicated, see MAR. Whiteboards updated, POC discussed. Call light within reach. Patient encouraged to call with any needs and or concerns. Mother pumping although states will do a better job of pumping every 3 hrs today. Patient encouraged to call.

## 2023-07-25 NOTE — CARE PLAN
Problem: Altered Physiologic Condition  Goal: Patient physiologically stable as evidenced by normal lochia, palpable uterine involution and vitals within normal limits  Outcome: Progressing     Problem: Infection - Postpartum  Goal: Postpartum patient will be free of signs and symptoms of infection  Outcome: Progressing     Problem: Early Mobilization - Post Surgery  Goal: Early mobilization post surgery  Outcome: Progressing     Problem: Pain - Standard  Goal: Alleviation of pain or a reduction in pain to the patient’s comfort goal  Outcome: Progressing     The patient is Stable - Low risk of patient condition declining or worsening    Shift Goals  Clinical Goals: Lochia light without clots, pain control  Patient Goals: pain control, rest  Family Goals: support    Progress made toward(s) clinical / shift goals:  Pain well controlled with non-narcotic analgesia, pt is able to ambulate and care for self and visit infants in NICU without difficulty. Lochia remains light without clots expressed, performing gustavo care and pad changes independently. No s/s infection noted on assessment, remains afebrile.     Patient is not progressing towards the following goals: NA

## 2023-07-25 NOTE — CARE PLAN
The patient is Stable - Low risk of patient condition declining or worsening    Shift Goals  Clinical Goals: Pain control, Fundus firm with light lochia  Patient Goals:   Family Goals:     Progress made toward(s) clinical / shift goals: Patient with a lot more pain this am. Taking scheduled motrin and tylenol. Encouraged to take ericka as needed for stronger pain. Ice and heat packs given as well. Fundus firm with normal lochia.   Patient is not progressing towards the following goals:

## 2023-07-25 NOTE — PROGRESS NOTES
Bedside report received from dayshift RN. 12hr chart check complete, MAR and orders reviewed. Pt resting in bed with family at bedside for support, call light within reach, denies pain or needs at this time.

## 2023-07-25 NOTE — LACTATION NOTE
Follow up lactation visit:    Met with Vicky to review pumping. She reports that she has been pumping, but has not expressed any colostrum. Vicky is reassured that is not uncommon at 2 days post-delivery. She is encouraged to continue with frequent, regular pumping and hand expression. She reports pumping to be comfortable with her 22.5mm flange inserts, and she denies any lactation related questions or concerns at this time.    Pumping Plan:    Pump breasts with hospital-grade double electric pump every 3 hours, for a total of 8+ pump sessions per 24 hours. Hand express for 2-3 minutes following each pump session to maximize breast stimulation.    Vicky is encouraged to call for RN/LC assistance with any developing breastfeeding/pumping related needs.

## 2023-07-26 LAB
ALBUMIN SERPL BCP-MCNC: 3.1 G/DL (ref 3.2–4.9)
ALBUMIN/GLOB SERPL: 1.3 G/DL
ALP SERPL-CCNC: 73 U/L (ref 30–99)
ALT SERPL-CCNC: 14 U/L (ref 2–50)
ANION GAP SERPL CALC-SCNC: 10 MMOL/L (ref 7–16)
AST SERPL-CCNC: 11 U/L (ref 12–45)
BILIRUB SERPL-MCNC: 0.3 MG/DL (ref 0.1–1.5)
BUN SERPL-MCNC: 14 MG/DL (ref 8–22)
CALCIUM ALBUM COR SERPL-MCNC: 9.3 MG/DL (ref 8.5–10.5)
CALCIUM SERPL-MCNC: 8.6 MG/DL (ref 8.5–10.5)
CHLORIDE SERPL-SCNC: 106 MMOL/L (ref 96–112)
CO2 SERPL-SCNC: 25 MMOL/L (ref 20–33)
CREAT SERPL-MCNC: 0.57 MG/DL (ref 0.5–1.4)
ERYTHROCYTE [DISTWIDTH] IN BLOOD BY AUTOMATED COUNT: 42.5 FL (ref 35.9–50)
GFR SERPLBLD CREATININE-BSD FMLA CKD-EPI: 125 ML/MIN/1.73 M 2
GLOBULIN SER CALC-MCNC: 2.3 G/DL (ref 1.9–3.5)
GLUCOSE SERPL-MCNC: 106 MG/DL (ref 65–99)
HCT VFR BLD AUTO: 32.5 % (ref 37–47)
HGB BLD-MCNC: 10.6 G/DL (ref 12–16)
MCH RBC QN AUTO: 29.9 PG (ref 27–33)
MCHC RBC AUTO-ENTMCNC: 32.6 G/DL (ref 32.2–35.5)
MCV RBC AUTO: 91.5 FL (ref 81.4–97.8)
PLATELET # BLD AUTO: 186 K/UL (ref 164–446)
PMV BLD AUTO: 10.1 FL (ref 9–12.9)
POTASSIUM SERPL-SCNC: 3.8 MMOL/L (ref 3.6–5.5)
PROT SERPL-MCNC: 5.4 G/DL (ref 6–8.2)
RBC # BLD AUTO: 3.55 M/UL (ref 4.2–5.4)
SODIUM SERPL-SCNC: 141 MMOL/L (ref 135–145)
WBC # BLD AUTO: 10 K/UL (ref 4.8–10.8)

## 2023-07-26 PROCEDURE — A9270 NON-COVERED ITEM OR SERVICE: HCPCS | Performed by: OBSTETRICS & GYNECOLOGY

## 2023-07-26 PROCEDURE — 770002 HCHG ROOM/CARE - OB PRIVATE (112)

## 2023-07-26 PROCEDURE — 85027 COMPLETE CBC AUTOMATED: CPT

## 2023-07-26 PROCEDURE — 36415 COLL VENOUS BLD VENIPUNCTURE: CPT

## 2023-07-26 PROCEDURE — 80053 COMPREHEN METABOLIC PANEL: CPT

## 2023-07-26 PROCEDURE — 700102 HCHG RX REV CODE 250 W/ 637 OVERRIDE(OP): Performed by: OBSTETRICS & GYNECOLOGY

## 2023-07-26 RX ORDER — MECLIZINE HCL 12.5 MG/1
12.5 TABLET ORAL EVERY 6 HOURS PRN
Status: DISCONTINUED | OUTPATIENT
Start: 2023-07-26 | End: 2023-07-28 | Stop reason: HOSPADM

## 2023-07-26 RX ADMIN — ACETAMINOPHEN 1000 MG: 500 TABLET, FILM COATED ORAL at 06:13

## 2023-07-26 RX ADMIN — OXYCODONE HYDROCHLORIDE 10 MG: 5 TABLET ORAL at 17:49

## 2023-07-26 RX ADMIN — OXYCODONE HYDROCHLORIDE 5 MG: 5 TABLET ORAL at 23:12

## 2023-07-26 RX ADMIN — ACETAMINOPHEN 1000 MG: 500 TABLET, FILM COATED ORAL at 17:50

## 2023-07-26 RX ADMIN — IBUPROFEN 800 MG: 800 TABLET, FILM COATED ORAL at 01:06

## 2023-07-26 RX ADMIN — IBUPROFEN 800 MG: 800 TABLET, FILM COATED ORAL at 09:21

## 2023-07-26 RX ADMIN — OXYCODONE HYDROCHLORIDE 10 MG: 5 TABLET ORAL at 00:06

## 2023-07-26 RX ADMIN — SIMETHICONE 125 MG: 125 TABLET, CHEWABLE ORAL at 00:06

## 2023-07-26 RX ADMIN — IBUPROFEN 800 MG: 800 TABLET, FILM COATED ORAL at 17:23

## 2023-07-26 RX ADMIN — PRENATAL WITH FERROUS FUM AND FOLIC ACID 1 TABLET: 3080; 920; 120; 400; 22; 1.84; 3; 20; 10; 1; 12; 200; 27; 25; 2 TABLET ORAL at 09:42

## 2023-07-26 RX ADMIN — ACETAMINOPHEN 1000 MG: 500 TABLET, FILM COATED ORAL at 12:12

## 2023-07-26 RX ADMIN — DOCUSATE SODIUM 100 MG: 100 CAPSULE, LIQUID FILLED ORAL at 00:06

## 2023-07-26 ASSESSMENT — PAIN DESCRIPTION - PAIN TYPE
TYPE: ACUTE PAIN
TYPE: SURGICAL PAIN
TYPE: SURGICAL PAIN

## 2023-07-26 NOTE — PROGRESS NOTES
Assessment completed. Fundus firm, lochia light. Abd. incisions with mepilex silver dressing intact. Plan of care reviewed. Denies pain at this time, will call if pain med intervention needed. Call light within reach, bed at lowest position, and shows no signs of distress at this time.

## 2023-07-26 NOTE — PROGRESS NOTES
Hospital Day : 20    S: doing well; babies stable in nicu; pumping and waiting for milk; good pain control; ambulate and min lochia    O:  Vitals:    07/25/23 0200 07/25/23 0600 07/25/23 1754 07/26/23 0600   BP: 96/58 97/52 100/62 117/65   Pulse: 70 78 77 72   Resp: 16 15 17 15   Temp: 36.4 °C (97.6 °F) 36.7 °C (98.1 °F) 36.5 °C (97.7 °F) 36.1 °C (97 °F)   TempSrc: Temporal Temporal Temporal Temporal   SpO2: 96% 96% 96% 95%   Weight:       Height:           Recent Labs     07/24/23  0045 07/24/23  0048 07/24/23  1159   WBC 9.5 10.5 17.1*   RBC 4.26 4.31 3.83*   HEMOGLOBIN 13.0 13.0 11.7*   HEMATOCRIT 40.2 39.2 33.8*   MCV 94.4 91.0 88.3   MCH 30.5 30.2 30.5   MCHC 32.3 33.2 34.6   RDW 43.5 41.7 39.4   PLATELETCT 202 223 204   MPV 10.1 10.8 10.5             Abd soft; mepelex cdi    A: pod 2 sp 1ltcs mono di twins at 28.3    P: cpm; dc 1-2

## 2023-07-26 NOTE — PROGRESS NOTES
1900 Assumed care of patient at change of shift.  Discussed plan of care with patient and answered all questions.      1945 Assessment completed.  Fundus firm, lochia light.  Abdominal incision with mepilex silver dressing clean, dry & intact. Patient will call if pain intervention needed.     2000 Patient to NICU to visit infants.     2145 Patient back to room.

## 2023-07-26 NOTE — CARE PLAN
The patient is Stable - Low risk of patient condition declining or worsening    Shift Goals  Clinical Goals: Vital signs WDL, pain control  Patient Goals: healthy babies  Family Goals: support      Problem: Knowledge Deficit - Postpartum  Goal: Patient will verbalize and demonstrate understanding of self and infant care  Outcome: Progressing  Note: Patient demonstrates self care for herself.     Problem: Psychosocial - Postpartum  Goal: Patient will verbalize and demonstrate effective bonding and parenting behavior  Outcome: Progressing  Note: Patient has gone to visit babies through out the shift. Patient shows that she is eager to learn and be with infants.

## 2023-07-26 NOTE — LACTATION NOTE
Follow up lactation visit:    Met with Vicky to follow up on pumping. She states that she pumped most recently at 1500 yesterday. She reports that she feels that she was not achieving adequate suction with her 22.5mm flanges, but when attempting to use pump with 25mm flanges, she was uncomfortable. We discussed that the suction setting can be increased with the 22.5mm flange inserts, if she feels that the suction is inadequate.     LC assisted with pumping at this time. Vicky reports feeling adequate stimulation at 35% suction, using 22.5mm flange inserts. Hand expression demonstrated following pump session, with small amount of glistening visible on right nipple.    Vicky reports that, with her previous children, she was only able to express 0.5 ounce per breast. We discussed the small volumes that her NICU babies will be consuming in their first day of life, and the fact that an ounce of milk can go a long way in feeding them!     Pumping Plan:    Increase pumping frequency to every 3 hours, for a total of 8+ pump sessions per 24 hours. Hand express for 2-3 minutes following each pump session to maximize breast stimulation.    Vicky is encouraged to call for RN/LC assistance with any developing breastfeeding/pumping related needs.

## 2023-07-26 NOTE — CARE PLAN
The patient is Stable - Low risk of patient condition declining or worsening    Shift Goals  Clinical Goals: Vital signs WNL, fundus firm, lochia WNL, help with NICU info  Patient Goals: healthy babies  Family Goals: support    Progress made toward(s) clinical / shift goals:    Problem: Psychosocial - Postpartum  Goal: Patient will verbalize and demonstrate effective bonding and parenting behavior  Outcome: Progressing  Note: Patient verbalizes and demonstrates effective bonding and parenting behavior by visiting infant often in NICU.     Problem: Altered Physiologic Condition  Goal: Patient physiologically stable as evidenced by normal lochia, palpable uterine involution and vitals within normal limits  Outcome: Progressing  Note: Patient is physiologically stable as evidenced by normal lochia, firm fundus, and vitals WNL.      Problem: Infection - Postpartum  Goal: Postpartum patient will be free of signs and symptoms of infection  Outcome: Progressing  Note: Patient is free of signs/symptoms of infection.  Temperature is WNL.        Patient is not progressing towards the following goals:

## 2023-07-27 PROBLEM — O30.032 MONOCHORIONIC DIAMNIOTIC TWIN PREGNANCY IN SECOND TRIMESTER: Status: RESOLVED | Noted: 2023-07-06 | Resolved: 2023-07-27

## 2023-07-27 PROCEDURE — A9270 NON-COVERED ITEM OR SERVICE: HCPCS | Performed by: OBSTETRICS & GYNECOLOGY

## 2023-07-27 PROCEDURE — 770002 HCHG ROOM/CARE - OB PRIVATE (112)

## 2023-07-27 PROCEDURE — 700102 HCHG RX REV CODE 250 W/ 637 OVERRIDE(OP): Performed by: OBSTETRICS & GYNECOLOGY

## 2023-07-27 RX ORDER — IBUPROFEN 800 MG/1
800 TABLET ORAL EVERY 8 HOURS
Qty: 15 TABLET | Refills: 0 | Status: CANCELLED | OUTPATIENT
Start: 2023-07-27 | End: 2023-08-01

## 2023-07-27 RX ORDER — ACETAMINOPHEN 500 MG
1000 TABLET ORAL EVERY 6 HOURS
Qty: 30 TABLET | Refills: 0 | Status: CANCELLED | OUTPATIENT
Start: 2023-07-27

## 2023-07-27 RX ADMIN — IBUPROFEN 800 MG: 800 TABLET, FILM COATED ORAL at 16:57

## 2023-07-27 RX ADMIN — ACETAMINOPHEN 1000 MG: 500 TABLET, FILM COATED ORAL at 12:49

## 2023-07-27 RX ADMIN — ACETAMINOPHEN 1000 MG: 500 TABLET, FILM COATED ORAL at 07:17

## 2023-07-27 RX ADMIN — ACETAMINOPHEN 1000 MG: 500 TABLET, FILM COATED ORAL at 00:56

## 2023-07-27 RX ADMIN — IBUPROFEN 800 MG: 800 TABLET, FILM COATED ORAL at 00:56

## 2023-07-27 RX ADMIN — PRENATAL WITH FERROUS FUM AND FOLIC ACID 1 TABLET: 3080; 920; 120; 400; 22; 1.84; 3; 20; 10; 1; 12; 200; 27; 25; 2 TABLET ORAL at 07:18

## 2023-07-27 RX ADMIN — IBUPROFEN 800 MG: 800 TABLET, FILM COATED ORAL at 09:58

## 2023-07-27 RX ADMIN — ACETAMINOPHEN 1000 MG: 500 TABLET, FILM COATED ORAL at 18:20

## 2023-07-27 ASSESSMENT — PAIN DESCRIPTION - PAIN TYPE
TYPE: SURGICAL PAIN
TYPE: SURGICAL PAIN

## 2023-07-27 ASSESSMENT — EDINBURGH POSTNATAL DEPRESSION SCALE (EPDS)
I HAVE FELT SAD OR MISERABLE: NOT VERY OFTEN
I HAVE BEEN SO UNHAPPY THAT I HAVE HAD DIFFICULTY SLEEPING: NOT VERY OFTEN
I HAVE BLAMED MYSELF UNNECESSARILY WHEN THINGS WENT WRONG: YES, SOME OF THE TIME
I HAVE LOOKED FORWARD WITH ENJOYMENT TO THINGS: RATHER LESS THAN I USED TO
I HAVE BEEN SO UNHAPPY THAT I HAVE BEEN CRYING: ONLY OCCASIONALLY
I HAVE BEEN ABLE TO LAUGH AND SEE THE FUNNY SIDE OF THINGS: NOT QUITE SO MUCH NOW
I HAVE FELT SCARED OR PANICKY FOR NO GOOD REASON: YES, SOMETIMES
I HAVE BEEN ANXIOUS OR WORRIED FOR NO GOOD REASON: YES, SOMETIMES
THE THOUGHT OF HARMING MYSELF HAS OCCURRED TO ME: NEVER
THINGS HAVE BEEN GETTING ON TOP OF ME: YES, SOMETIMES I HAVEN'T BEEN COPING AS WELL AS USUAL

## 2023-07-27 NOTE — PROGRESS NOTES
Assumed care of patient at change of shift. Discussed POC with patient and answered all questions. Patient denies feeling lightheaded or dizzy.

## 2023-07-27 NOTE — CARE PLAN
The patient is Stable - Low risk of patient condition declining or worsening    Shift Goals  Clinical Goals: pain control; rest  Patient Goals: healthy babies  Family Goals: support    Progress made toward(s) clinical / shift goals:  Pain controlled through scheduled pain medication administrations. Pt slept with minimal interruptions.     Patient is not progressing towards the following goals:

## 2023-07-27 NOTE — LACTATION NOTE
This note was copied from a baby's chart.  Follow up lactation support : Mom states he is beginning to express more colostrum. She feels her breasts are firmer today. LC assessed mother's breast with permission. Lc taught hand massage and breast softening before pumping. LC was able to show mom how  massage can soften the breast. LC discussed engorgement and basic causes and treatment. Mom reports that setting are comfortable. Reminded mom of Q 3 hour pumping to maximize milk supply.   Mom was encouraged to rent a HG pump for several weeks before using personal [employer] pump.  Lactation to follow up before discharge

## 2023-07-27 NOTE — PROGRESS NOTES
Obstetrics  Section Postpartum Progress Note    Events  Was feeling really dizzy--likely due to oxy    Subjective:  Doing well. Reports pain is controlled. Lochia minimal. Ambulating. Voiding without difficulty. + Flatus. No bowel movement. Denies headaches or changes in vision. Breast pumping--baies doing well in NICU    PHYSICAL EXAM:  Vitals:   Vitals:    23 0600 23 1820 23 1855 23 0600   BP: 117/65 103/65 105/72 98/64   Pulse: 72 88 94 70   Resp: 15 16 18 17   Temp: 36.1 °C (97 °F) 36.9 °C (98.4 °F)  36.6 °C (97.8 °F)   TempSrc: Temporal Temporal  Temporal   SpO2: 95% 95% 95% 96%   Weight:       Height:          General: Alert, conversational, pleasant, no acute distress.  CVS: Regular rate.  PULM: No respiratory distress, symmetric expansion.  ABD: Soft, non-tender, non-distended, fundus firm, non-tender, below the umbilicus. Incision dressing clean and dry. No surrounding erythema or drainage.   : Deferred  Extremities: Moves all, trace edema.     Labs Reviewed and Significant for:  Recent Labs     23  1159 23  1932   WBC 17.1* 10.0   RBC 3.83* 3.55*   HEMOGLOBIN 11.7* 10.6*   HEMATOCRIT 33.8* 32.5*   MCV 88.3 91.5   MCH 30.5 29.9   RDW 39.4 42.5   PLATELETCT 204 186   MPV 10.5 10.1   NEUTSPOLYS 89.90*  --    LYMPHOCYTES 6.40*  --    MONOCYTES 2.80  --    EOSINOPHILS 0.00  --    BASOPHILS 0.20  --         Assessment and Plan:    Vicky Garza is a 27 y.o.  postop day 3 s/p      Greg, Baby Boy A [[id number]]   , Low Transverse      Greg, Baby Boy B [[id number]]   , Low Transverse  at 28w3d, EBL     ASSESSMENT:  Postpartum state  Anemia  Working on lactation  Babies in NICU  Dizziness- improved    PLAN:  - Continue routine postpartum care.   - likely home tomorrow  Cammy Martin M.D.

## 2023-07-27 NOTE — CARE PLAN
The patient is Stable - Low risk of patient condition declining or worsening    Shift Goals  Clinical Goals: Pain control  Patient Goals: healthy babies  Family Goals: support    Progress made toward(s) clinical / shift goals:  Pain well controlled with current meds    Patient is not progressing towards the following goals:

## 2023-07-27 NOTE — DISCHARGE SUMMARY
Obstetrics Discharge Summary    Admission Date: 2023         Discharge Date: 2023    ADMISSION DIAGNOSIS:  1.  Monochorionic diamniotic twin gestation at 25+6 weeks.  2.  Selective fetal growth restriction of twin B with abnormal umbilical artery Dopplers.    DISCHARGE DIAGNOSIS:  1.  Monochorionic diamniotic twin gestation at 25+6 weeks.  2.  Selective fetal growth restriction of twin B with abnormal umbilical artery Dopplers.    DETAILS OF HOSPITAL STAY  Presenting Problem/History of Present Illness: Selective fetal growth restriction of twin B with abnormal umbilical artery Dopplers requiring inpatient management.    Hospital Course:  The patient is a 27 y.o.  who presented to [facility] at 25w6d weeks with a chief complaint of: Selective fetal growth restriction of twin B with abnormal umbilical artery Dopplers requiring inpatient management. She had prenatal care with [facility] with Dr. Ríos.  Her pregnancy was complicated by: Monochorionic diamniotic twin gestation.  The patient was admitted on antepartum for approximately 3 weeks due to selective fetal growth restriction and abnormal umbilical artery Dopplers of twin B.  Twin B ultimately ended up having a nonreassuring fetal heart tracing and and she was recommended to proceed with delivery.    For full details of the operation, please refer to the operative report dictation. Briefly, the patient was taken to the operating room where a primary  section was performed. Bilateral salpingectomy was not preformed. There were no complications. Estimated blood loss was 600 ml. Findings included normal-appearing uterus, fallopian tubes and ovaries bilaterally.  Twin A was a male infant weighing 1190 g (2 pounds 10 ounces) with Apgar scores of 6 at 1 minute and 8 at 5 minutes of life. Twin B was a male infant weighing 958 g (2 pounds 1.8 ounces) with Apgar scores of 5 at 1 minute and 8 at 5 minutes of life.  The patient's recovery  and post-operative courses were unremarkable. By post-operative day #4, the patient met all appropriate milestones and was stable to be discharged to home.    COMPLICATIONS: None.     PHYSICAL EXAM:  Vitals:   Vitals:    07/27/23 1758   BP: 106/69   Pulse: 90   Resp: 16   Temp: 36.7 °C (98.1 °F)   SpO2: 98%   General: Alert, conversational, pleasant, no acute distress  Cardiovascular: Regular rate  Pulmonary: No respiratory distress, symmetric expansion  Abdomen: Soft, non-tender, non-distended, fundus firm, non-tender, below the umbilicus; incision dressing clean, dry and intact  Genitourinary: Deferred  Extremities: Moves all, trace edema     LABS/STUDIES:    Latest Reference Range & Units 07/24/23 11:59 07/26/23 19:32   WBC 4.8 - 10.8 K/uL 17.1 (H) 10.0   RBC 4.20 - 5.40 M/uL 3.83 (L) 3.55 (L)   Hemoglobin 12.0 - 16.0 g/dL 11.7 (L) 10.6 (L)   Hematocrit 37.0 - 47.0 % 33.8 (L) 32.5 (L)   MCV 81.4 - 97.8 fL 88.3 91.5   MCH 27.0 - 33.0 pg 30.5 29.9   MCHC 32.2 - 35.5 g/dL 34.6 32.6   RDW 35.9 - 50.0 fL 39.4 42.5   Platelet Count 164 - 446 K/uL 204 186   MPV 9.0 - 12.9 fL 10.5 10.1     DISPOSITION: Home.    DISCHARGE MEDICATIONS:  - Oxycodone 5 mg PO every 8 hours prn pain x 7 days  - Tylenol 650 mg PO every 6 hours x 7 day  - Ibuprofen 650 mg PO every 6 hours x 7 day  - Colace 100mg PO BID x 30 days    DISCHARGE INSTRUCTIONS:  1. Pelvic rest and no heavy lifting greater than 10 pounds for 6 weeks post-operatively.   2. No driving for at least 2 weeks or longer while requiring pain medication.   3. Incision check in 1 week at [facility] [phone]  4. Post-partum vist in 6 weeks at [facility] [phone]  5. Return to the emergency department if experiencing increased vaginal bleeding, severe pain, temperature greater than 100.4, or any other concerns.    DISCHARGE CONDITION: Stable.    Trinidad Licona M.D.

## 2023-07-27 NOTE — PROGRESS NOTES
1850: Received bedside report from day shift RN, Dasha BRIONES Greeted pt and SO at the bedside. Whiteboard updated. Pt complains of increased lightheadedness and dizziness, to the point where she feels uncomfortable standing up. Per day shift RN, pt has been feeling lightheadedness since admission to Postpartum. She received Oxycodone 10 mg at 1749. VS are of the following: BP of 105/72; HR of 94 bpm, Respirations of 18 breaths per minute, and SpO2 of 95% RA. Pt reports that her bleeding has been light and that she has been drinking lots of water throughout the day.      1920: Updated Dr. Martin on pt's status and symptoms. Per MD, a CBC without differential and CMP lab order was placed.     1932: Updated pt on the plan to draw these labs. Answered all questions at this time. Pt will notify this RN if she experiences other symptoms or an increase of these symptoms.      2015: Completed assessment. Lochia scant and fundus one fingerbreadth below umbilicus. Pt complains of pain at this time. Declines interventions. Will notify this RN when needing pain interventions. Pt states that her lightheadedness has decreased. No IV in place. POC discussed: pain control, lochia, hydration, and ambulation. Reinforced education on emergency and non-emergency call light system, and bed safety. Pt verbalized understanding. Call light placed within reach.    2021: Updated Dr. Martin on pt's CBC without differential and CMP lab results. MD states that her labs are normal. She will be placing an order for medication that will help her dizziness.This information relayed to pt.

## 2023-07-28 ENCOUNTER — PHARMACY VISIT (OUTPATIENT)
Dept: PHARMACY | Facility: MEDICAL CENTER | Age: 28
End: 2023-07-28
Payer: COMMERCIAL

## 2023-07-28 VITALS
OXYGEN SATURATION: 96 % | HEIGHT: 67 IN | DIASTOLIC BLOOD PRESSURE: 69 MMHG | SYSTOLIC BLOOD PRESSURE: 104 MMHG | HEART RATE: 71 BPM | BODY MASS INDEX: 24.8 KG/M2 | TEMPERATURE: 98.2 F | RESPIRATION RATE: 16 BRPM | WEIGHT: 158 LBS

## 2023-07-28 PROCEDURE — RXMED WILLOW AMBULATORY MEDICATION CHARGE: Performed by: OBSTETRICS & GYNECOLOGY

## 2023-07-28 PROCEDURE — A9270 NON-COVERED ITEM OR SERVICE: HCPCS | Performed by: OBSTETRICS & GYNECOLOGY

## 2023-07-28 PROCEDURE — 700102 HCHG RX REV CODE 250 W/ 637 OVERRIDE(OP): Performed by: OBSTETRICS & GYNECOLOGY

## 2023-07-28 RX ORDER — OXYCODONE HYDROCHLORIDE 5 MG/1
5 TABLET ORAL EVERY 8 HOURS PRN
Qty: 15 TABLET | Refills: 0 | Status: SHIPPED | OUTPATIENT
Start: 2023-07-28 | End: 2023-08-02

## 2023-07-28 RX ORDER — PSEUDOEPHEDRINE HCL 30 MG
100 TABLET ORAL 2 TIMES DAILY PRN
Qty: 28 CAPSULE | Refills: 0 | Status: SHIPPED | OUTPATIENT
Start: 2023-07-28 | End: 2023-08-11

## 2023-07-28 RX ADMIN — ACETAMINOPHEN 1000 MG: 500 TABLET, FILM COATED ORAL at 00:53

## 2023-07-28 RX ADMIN — IBUPROFEN 800 MG: 800 TABLET, FILM COATED ORAL at 00:54

## 2023-07-28 RX ADMIN — PRENATAL WITH FERROUS FUM AND FOLIC ACID 1 TABLET: 3080; 920; 120; 400; 22; 1.84; 3; 20; 10; 1; 12; 200; 27; 25; 2 TABLET ORAL at 08:11

## 2023-07-28 RX ADMIN — IBUPROFEN 800 MG: 800 TABLET, FILM COATED ORAL at 09:01

## 2023-07-28 ASSESSMENT — PAIN DESCRIPTION - PAIN TYPE: TYPE: SURGICAL PAIN

## 2023-07-28 NOTE — PROGRESS NOTES
Assessment complete. Fundus firm, lochia light. VSS. Tolerating diet. Voiding without difficulty. Incision dressing CDI. Pain controlled with prn medications per mar. Will offer pain medications as they become available. POC discussed with pt. Encouraged to call with needs. Call light in place.

## 2023-07-28 NOTE — CARE PLAN
The patient is Stable - Low risk of patient condition declining or worsening    Shift Goals  Clinical Goals: VS,light lochia,incision dressing clean,dry and intact  Patient Goals: pain control,visit infants in NICU  Family Goals: visit infants in NICU,rest    Progress made toward(s) clinical / shift goals:  progressing    Patient is not progressing towards the following goals:

## 2023-07-28 NOTE — PROGRESS NOTES
1820- pt reports feeling full and uncomfortable after pumping. Hand expression to soften breasts and pumping settings discussed. Discussed trialing 35% suction to see if it alleviates discomfort. Discussed ordering hand pump to help express milk PRN if breasts continue to feel full. Pt will let RN know if hand pump is desired.

## 2023-07-28 NOTE — DISCHARGE PLANNING
:    Received consult that MOB scored a 13 on the EPDS screen.  MOB has a history of anxiety and depression and has twins in the NICU (28.3 weeks).  SW met with MOB to discuss and provided a list of PP depression resources with counseling and support groups.  Parents will be checking into the [facility] today.

## 2023-07-28 NOTE — DISCHARGE INSTRUCTIONS
DISCHARGE INSTRUCTIONS:  1. Pelvic rest and no heavy lifting greater than 10 pounds for 6 weeks post-operatively.   2. No driving for at least 2 weeks or longer while requiring pain medication.   3. Incision check in 1 week at [facility] [phone]  4. Post-partum vist in 6 weeks at [facility] [phone]  5. Return to the emergency department if experiencing increased vaginal bleeding, severe pain, temperature greater than 100.4, or any other concerns.  DISCHARGE MEDICATIONS:  - Oxycodone 5 mg PO every 8 hours prn pain x 7 days  - Tylenol 650 mg PO every 6 hours x 7 day  - Ibuprofen 650 mg PO every 6 hours x 7 day  - Colace 100mg PO BID x 30 days     PATIENT DISCHARGE EDUCATION INSTRUCTION SHEET    REASONS TO CALL YOUR OBSTETRICIAN  Persistent fever, shaking, chills (Temperature higher than 100.4) may indicate you have an infection  Heavy bleeding: soaking more than 1 pad per hour; Passing clots an egg-sized clot or bigger may mean you have an postpartum hemorrhage  Foul odor from vagina or bad smelling or discolored discharge or blood  Breast infection (Mastitis symptoms); breast pain, chills, fever, redness or red streaks, may feel flu like symptoms  Urinary pain, burning or frequency  Incision that is not healing, increased redness, swelling, tenderness or pain, or any pus from episiotomy or  site may mean you have an infection  Redness, swelling, warmth, or painful to touch in the calf area of your leg may mean you have a blood clot  Severe or intensified depression, thoughts or feelings of wanting to hurt yourself or someone else   Pain in chest, obstructed breathing or shortness of breath (trouble catching your breath) may mean you are having a postpartum complication. Call your provider immediately   Headache that does not get better, even after taking medicine, a bad headache with vision changes or pain in the upper right area of your belly may mean you have high blood pressure  or post birth preeclampsia. Call your provider immediately    HAND WASHING  All family and friends should wash their hands:  Before and after holding the baby  Before feeding the baby  After using the restroom or changing the baby's diaper    WOUND CARE  Ask your physician for additional care instructions. In general:   Incision:  May shower and pat incision dry   Keep the incision clean and dry  There should not be any opening or pus from the incision  Continue to walk at home 3 times a day   Do NOT lift anything heavier than your baby (over 10 pounds)  Encourage family to help participate in care of the  to allow rest and mom time to heal  Episiotomy/Laceration  May use gustavo-spray bottle, witch hazel pads and dermaplast spray for comfort  Use gustavo-spray bottle after urinating to cleanse perineal area  To prevent burning during urination spray gustavo-water bottle on labial area   Pat perineal area dry until episiotomy/laceration is healed  Continue to use gustavo-bottle until bleeding stops as needed  If have a 2nd degree laceration or greater, a Sitz bath can offer relief from soreness, burning, and inflammation   Sitz Bath   Sit in 6 inches of warm water and soak laceration as needed until the laceration heals    VAGINAL CARE AND BLEEDING  Nothing inside vagina for 6 weeks:   No sexual intercourse, tampons or douching  Bleeding may continue for 2-4 weeks. Amount and color may vary  Soaking 1 pad or more in an hour for several hours is considered heavy bleeding  Passing large egg sized blood clots can be concerning  If you feel like you have heavy bleeding or are having increasing amount of blood clots call your Obstetrician immediately  If you begin feeling faint upon standing, feeling sick to your stomach, have clammy skin, a really fast heartbeat, have chills, start feeling confused, dizzy, sleepy or weak, or feeling like you're going to faint call your Obstetrician immediately    HYPERTENSION  "  Preeclampsia or gestational hypertension are types of high blood pressure that only pregnant women can get. It is important for you to be aware of symptoms to seek early intervention and treatment. If you have any of these symptoms immediately call your Obstetrician    Vision changes or blurred vision   Severe headache or pain that is unrelieved with medication and will not go away  Persistent pain in upper abdomen or shoulder   Increased swelling of face, feet, or hands  Difficulty breathing or shortness of breath at rest  Urinating less than usual    URINATION AND BOWEL MOVEMENTS  Eating more fiber (bran cereal, fruits, and vegetables) and drinking plenty of fluids will help to avoid constipation  Urinary frequency and urgency after childbirth is normal  If you experience any urinary pain, burning or frequency call your provider    BIRTH CONTROL  It is possible to become pregnant at any time after delivery and while breastfeeding  Plan to discuss a method of birth control with your physician at your post delivery follow up visit    POSTPARTUM BLUES  During the first few days after birth, you may experience a sense of the \"blues\" which may include impatience, irritability or even crying. These feelings come and go quickly. However, as many as 1 in 10 women experience emotional symptoms known as postpartum depression.     POSTPARTUM DEPRESSION    May start as early as the second or third day after delivery or take several weeks or months to develop. Symptoms of \"blues\" are present, but are more intense: Crying spells; loss of appetite; feelings of hopelessness or loss of control; fear of touching the baby; over concern or no concern at all about the baby; little or no concern about your own appearance/caring for yourself; and/or inability to sleep or excessive sleeping. Contact your Obstetrician if you are experiencing any of these symptoms     PREVENTING SHAKEN BABY  If you are angry or stressed, PUT THE BABY IN " "THE CRIB, step away, take some deep breaths, and wait until you are calm to care for the baby. DO NOT SHAKE THE BABY. You are not alone, call a supporter for help.  [facility] 24/7 crisis call line ([phone]) or (1-666.848.3665)  You can also text them, text \"ANSWER\" (928876)  "

## 2023-07-28 NOTE — LACTATION NOTE
"This note was copied from a baby's chart.  Follow up lactation support: Mom preparing for discharge and states her breasts feel \"so much better today\". LC encouraged continued engorgement care until this evening then eliminate the cold compresses. Mom is now pumping more volume since last night. LC reviewed normal engorgement at this DOL and recommended that mom continue Q 3 hour pumping to relieve milk stasis and stimulate supply.  Mom will consider renting a HG pump for several weeks. Mom was given rental handout information for [facility].  LC discussed their role in the NICU as babies remain admitted.   "

## 2025-03-05 ENCOUNTER — RESULTS FOLLOW-UP (OUTPATIENT)
Dept: URGENT CARE | Facility: PHYSICIAN GROUP | Age: 30
End: 2025-03-05

## 2025-03-05 ENCOUNTER — OFFICE VISIT (OUTPATIENT)
Dept: URGENT CARE | Facility: PHYSICIAN GROUP | Age: 30
End: 2025-03-05
Payer: COMMERCIAL

## 2025-03-05 VITALS
TEMPERATURE: 97.6 F | OXYGEN SATURATION: 98 % | BODY MASS INDEX: 21.6 KG/M2 | HEART RATE: 90 BPM | WEIGHT: 137.9 LBS | RESPIRATION RATE: 16 BRPM | DIASTOLIC BLOOD PRESSURE: 64 MMHG | SYSTOLIC BLOOD PRESSURE: 124 MMHG

## 2025-03-05 DIAGNOSIS — J01.40 ACUTE NON-RECURRENT PANSINUSITIS: ICD-10-CM

## 2025-03-05 LAB
FLUAV RNA SPEC QL NAA+PROBE: NEGATIVE
FLUBV RNA SPEC QL NAA+PROBE: NEGATIVE
RSV RNA SPEC QL NAA+PROBE: NEGATIVE
SARS-COV-2 RNA RESP QL NAA+PROBE: NEGATIVE

## 2025-03-05 PROCEDURE — 3074F SYST BP LT 130 MM HG: CPT | Performed by: NURSE PRACTITIONER

## 2025-03-05 PROCEDURE — 3078F DIAST BP <80 MM HG: CPT | Performed by: NURSE PRACTITIONER

## 2025-03-05 PROCEDURE — 0241U POCT CEPHEID COV-2, FLU A/B, RSV - PCR: CPT | Performed by: NURSE PRACTITIONER

## 2025-03-05 PROCEDURE — 99213 OFFICE O/P EST LOW 20 MIN: CPT | Performed by: NURSE PRACTITIONER

## 2025-03-05 RX ORDER — DOXYCYCLINE HYCLATE 100 MG
100 TABLET ORAL 2 TIMES DAILY
Qty: 14 TABLET | Refills: 0 | Status: SHIPPED | OUTPATIENT
Start: 2025-03-05 | End: 2025-03-12

## 2025-03-05 RX ORDER — FLUTICASONE PROPIONATE 50 MCG
2 SPRAY, SUSPENSION (ML) NASAL DAILY
Qty: 16 G | Refills: 0 | Status: SHIPPED | OUTPATIENT
Start: 2025-03-05

## 2025-03-05 ASSESSMENT — FIBROSIS 4 INDEX: FIB4 SCORE: .4899789435061113719

## 2025-03-05 NOTE — PROGRESS NOTES
Subjective:   Vicky Garza is a 29 y.o. female who presents for Facial Pain (Facial pain, headache, jaw pain, had a sinus infection beginning of feb + flu 2/14, still not feeling better )    Patient states for approximately 1 month she has had waxing and waning sinus pain and pressure, nasal congestion, jaw pain, and intermittent fatigue.  She states that she possibly had RSV the first part of February, did have influenza on February 14, patient did have a sinus infection after that and completed a 7 to 10-day course of Augmentin.  Patient has been off antibiotics for approximately 4 to 5 days and states that symptoms have now returned including sinus pain and pressure, headache, nasal congestion with green and yellow mucus, fatigue, body aches, and bilateral ear pain and pressure.  Patient denies any significant cough, she has not had any shortness of breath, fevers, nausea, or vomiting.  Patient has been taking multiple over-the-counter cold/sinus medication and ibuprofen with intermittent relief.    Medications, Allergies, and current problem list reviewed today in Epic.     Objective:     /64   Pulse 90   Temp 36.4 °C (97.6 °F) (Temporal)   Resp 16   Wt 62.6 kg (137 lb 14.4 oz)   SpO2 98%     Physical Exam  Vitals reviewed.   Constitutional:       General: She is not in acute distress.     Appearance: Normal appearance. She is ill-appearing. She is not toxic-appearing.   HENT:      Head: Normocephalic.      Right Ear: Tympanic membrane, ear canal and external ear normal.      Left Ear: Tympanic membrane, ear canal and external ear normal.      Nose: Mucosal edema, congestion and rhinorrhea present. Rhinorrhea is clear.      Right Turbinates: Swollen.      Left Turbinates: Swollen.      Right Sinus: No maxillary sinus tenderness or frontal sinus tenderness.      Left Sinus: No maxillary sinus tenderness or frontal sinus tenderness.      Mouth/Throat:      Mouth: Mucous membranes are moist.       Pharynx: Oropharynx is clear. No posterior oropharyngeal erythema.   Eyes:      Extraocular Movements: Extraocular movements intact.      Conjunctiva/sclera: Conjunctivae normal.      Pupils: Pupils are equal, round, and reactive to light.   Cardiovascular:      Rate and Rhythm: Normal rate and regular rhythm.   Pulmonary:      Effort: Pulmonary effort is normal.      Breath sounds: Normal breath sounds. No wheezing, rhonchi or rales.   Chest:      Chest wall: No tenderness.   Musculoskeletal:         General: Normal range of motion.      Cervical back: Normal range of motion and neck supple. No tenderness.   Lymphadenopathy:      Cervical: No cervical adenopathy.   Skin:     General: Skin is warm and dry.   Neurological:      Mental Status: She is alert and oriented to person, place, and time.   Psychiatric:         Mood and Affect: Mood normal.         Behavior: Behavior normal.         Thought Content: Thought content normal.         Judgment: Judgment normal.         Assessment/Plan:     Diagnosis and associated orders:     1. Acute non-recurrent pansinusitis  POCT CEPHEID COV-2, FLU A/B, RSV - PCR    fluticasone (FLONASE) 50 MCG/ACT nasal spray         Comments/MDM:     This is an acute condition.  Patient is nontoxic-appearing and in no acute distress.  Patient does not appear ill in clinic.  She does have significant sinusitis, with clear and purulent discharge noted in bilateral naris.  Patient also with sinus pain and tenderness.  POCT COVID, influenza, RSV testing all negative.  Did call and prescribe Flonase along with doxycycline as she recently completed 10-day course of Augmentin without any resolution of symptoms.  Side effects to medications were discussed with patient.  Stressed importance of nasal lavage and nasal toileting.  Represent in clinic if symptoms are not improving.  Patient was involved with shared decision-making throughout the exam today and verbalizes understanding regards to plan of  care, discharge instructions, and follow-up         Differential diagnosis, natural history, supportive care, and indications for immediate follow-up discussed.    Advised the patient to follow-up with the primary care physician for recheck, reevaluation, and consideration of further management.    I personally reviewed prior external notes and test results pertinent to today's visit as well as additional imaging and testing completed in clinic today.     Please note that this dictation was created using voice recognition software. I have made a reasonable attempt to correct obvious errors, but I expect that there are errors of grammar and possibly content that I did not discover before finalizing the note.

## (undated) DEVICE — PACK C-SECTION (2EA/CA)

## (undated) DEVICE — TUBING CLEARLINK DUO-VENT - C-FLO (48EA/CA)

## (undated) DEVICE — GLOVE BIOGEL SZ 6 PF LATEX - (50EA/BX 4BX/CA)

## (undated) DEVICE — HEAD HOLDER JUNIOR/ADULT

## (undated) DEVICE — TRAY SPINAL ANESTHESIA NON-SAFETY (10/CA)

## (undated) DEVICE — SODIUM CHL IRRIGATION 0.9% 1000ML (12EA/CA)

## (undated) DEVICE — ELECTRODE DUAL RETURN W/ CORD - (50/PK)

## (undated) DEVICE — KIT  I.V. START (100EA/CA)

## (undated) DEVICE — CATHETER IV NON-SAFETY 18 GA X 1 1/4 (50/BX 4BX/CA)

## (undated) DEVICE — WATER IRRIGATION STERILE 1000ML (12EA/CA)

## (undated) DEVICE — SUTURE 1 CHROMIC GUTCT-1 27 (36PK/BX)"

## (undated) DEVICE — DRESSING POST OP BORDER 4 X 10 (5EA/BX)

## (undated) DEVICE — SUTUREABS02-0 CT1 27IN - (36EA/BX)

## (undated) DEVICE — SET EXTENSION WITH 2 PORTS (48EA/CA) ***PART #2C8610 IS A SUBSTITUTE*****

## (undated) DEVICE — SUTURE 0 VICRYL PLUS CT-1 - 36 INCH (36/BX)

## (undated) DEVICE — STAPLER SKIN DISP - (6/BX 10BX/CA) VISISTAT